# Patient Record
Sex: MALE | Race: BLACK OR AFRICAN AMERICAN | Employment: FULL TIME | URBAN - METROPOLITAN AREA
[De-identification: names, ages, dates, MRNs, and addresses within clinical notes are randomized per-mention and may not be internally consistent; named-entity substitution may affect disease eponyms.]

---

## 2017-02-28 PROBLEM — Z87.39 HISTORY OF GOUT: Status: ACTIVE | Noted: 2017-02-28

## 2017-02-28 PROBLEM — Z86.010 HISTORY OF COLON POLYPS: Status: ACTIVE | Noted: 2017-02-28

## 2017-02-28 PROBLEM — K21.9 GASTROESOPHAGEAL REFLUX DISEASE WITHOUT ESOPHAGITIS: Status: ACTIVE | Noted: 2017-02-28

## 2017-04-04 PROBLEM — G47.26 SHIFT WORK SLEEP DISORDER: Status: ACTIVE | Noted: 2017-04-04

## 2017-07-25 ENCOUNTER — HOSPITAL ENCOUNTER (OUTPATIENT)
Dept: GENERAL RADIOLOGY | Age: 57
Discharge: HOME | End: 2017-07-25
Payer: COMMERCIAL

## 2017-07-25 DIAGNOSIS — R10.11 RUQ ABDOMINAL PAIN: ICD-10-CM

## 2017-07-25 PROCEDURE — 74020 XR ABD (AP AND ERECT OR DECUB): CPT

## 2017-07-26 PROBLEM — K57.30 DIVERTICULOSIS OF LARGE INTESTINE: Status: ACTIVE | Noted: 2017-07-26

## 2017-07-26 PROBLEM — K64.8 INTERNAL HEMORRHOIDS: Status: ACTIVE | Noted: 2017-07-26

## 2017-09-06 ENCOUNTER — HOSPITAL ENCOUNTER (OUTPATIENT)
Dept: ULTRASOUND IMAGING | Age: 57
Discharge: HOME | End: 2017-09-06
Attending: FAMILY MEDICINE
Payer: COMMERCIAL

## 2017-09-06 DIAGNOSIS — R22.0 MASS OF CHIN: ICD-10-CM

## 2017-09-06 PROCEDURE — 76536 US EXAM OF HEAD AND NECK: CPT

## 2017-09-06 NOTE — PROGRESS NOTES
Ultrasound of head and neck: the mass under the chin is a lymph node. Unsure why it swelled so large. Will continue to monitor. There were no signs of infection or malignancy.

## 2018-01-09 PROBLEM — E11.40 TYPE 2 DIABETES MELLITUS WITH DIABETIC NEUROPATHY (HCC): Status: RESOLVED | Noted: 2018-01-09 | Resolved: 2018-01-09

## 2018-01-09 PROBLEM — E11.40 TYPE 2 DIABETES MELLITUS WITH DIABETIC NEUROPATHY (HCC): Status: ACTIVE | Noted: 2018-01-09

## 2018-01-09 PROBLEM — Z87.39 HISTORY OF GOUT: Chronic | Status: ACTIVE | Noted: 2017-02-28

## 2018-01-09 PROBLEM — E11.9 TYPE 2 DIABETES MELLITUS WITHOUT COMPLICATION, WITHOUT LONG-TERM CURRENT USE OF INSULIN (HCC): Chronic | Status: ACTIVE | Noted: 2017-07-25

## 2018-05-08 PROBLEM — E11.9 TYPE 2 DIABETES MELLITUS WITHOUT COMPLICATION, WITHOUT LONG-TERM CURRENT USE OF INSULIN (HCC): Chronic | Status: RESOLVED | Noted: 2017-07-25 | Resolved: 2018-05-08

## 2018-05-08 PROBLEM — E11.21 TYPE 2 DIABETES WITH NEPHROPATHY (HCC): Status: ACTIVE | Noted: 2018-05-08

## 2018-05-09 PROBLEM — D50.9 IRON DEFICIENCY ANEMIA: Status: ACTIVE | Noted: 2018-05-09

## 2019-06-25 PROBLEM — R10.32 LLQ ABDOMINAL PAIN: Status: ACTIVE | Noted: 2019-06-25

## 2019-06-25 PROBLEM — K31.84 GASTROPARESIS DUE TO DM (HCC): Status: ACTIVE | Noted: 2019-06-25

## 2019-06-25 PROBLEM — E11.43 GASTROPARESIS DUE TO DM (HCC): Status: ACTIVE | Noted: 2019-06-25

## 2019-06-25 PROBLEM — R14.0 BLOATING: Status: ACTIVE | Noted: 2019-06-25

## 2019-08-11 ENCOUNTER — HOSPITAL ENCOUNTER (EMERGENCY)
Age: 59
Discharge: HOME | End: 2019-08-11
Payer: COMMERCIAL

## 2019-08-11 ENCOUNTER — APPOINTMENT (OUTPATIENT)
Dept: CT IMAGING | Age: 59
End: 2019-08-11
Payer: COMMERCIAL

## 2019-08-11 ENCOUNTER — APPOINTMENT (OUTPATIENT)
Dept: GENERAL RADIOLOGY | Age: 59
End: 2019-08-11
Payer: COMMERCIAL

## 2019-08-11 VITALS
DIASTOLIC BLOOD PRESSURE: 88 MMHG | WEIGHT: 189 LBS | RESPIRATION RATE: 16 BRPM | SYSTOLIC BLOOD PRESSURE: 139 MMHG | BODY MASS INDEX: 23.5 KG/M2 | OXYGEN SATURATION: 100 % | HEART RATE: 67 BPM | TEMPERATURE: 97.6 F | HEIGHT: 75 IN

## 2019-08-11 DIAGNOSIS — S09.90XA MILD CLOSED HEAD INJURY, INITIAL ENCOUNTER: Primary | ICD-10-CM

## 2019-08-11 DIAGNOSIS — S16.1XXA STRAIN OF NECK MUSCLE, INITIAL ENCOUNTER: ICD-10-CM

## 2019-08-11 DIAGNOSIS — S40.011A CONTUSION OF RIGHT SHOULDER, INITIAL ENCOUNTER: ICD-10-CM

## 2019-08-11 PROCEDURE — 73030 X-RAY EXAM OF SHOULDER: CPT

## 2019-08-11 PROCEDURE — 72125 CT NECK SPINE W/O DYE: CPT

## 2019-08-11 PROCEDURE — 70450 CT HEAD/BRAIN W/O DYE: CPT

## 2019-08-11 PROCEDURE — 74011250636 HC RX REV CODE- 250/636

## 2019-08-11 PROCEDURE — 96372 THER/PROPH/DIAG INJ SC/IM: CPT

## 2019-08-11 PROCEDURE — 99283 EMERGENCY DEPT VISIT LOW MDM: CPT

## 2019-08-11 RX ORDER — KETOROLAC TROMETHAMINE 30 MG/ML
60 INJECTION, SOLUTION INTRAMUSCULAR; INTRAVENOUS
Status: COMPLETED | OUTPATIENT
Start: 2019-08-11 | End: 2019-08-11

## 2019-08-11 RX ORDER — NAPROXEN 500 MG/1
500 TABLET ORAL 2 TIMES DAILY WITH MEALS
Qty: 20 TAB | Refills: 0 | Status: SHIPPED | OUTPATIENT
Start: 2019-08-11 | End: 2019-08-21

## 2019-08-11 RX ORDER — CYCLOBENZAPRINE HCL 10 MG
10 TABLET ORAL
Qty: 12 TAB | Refills: 0 | Status: SHIPPED | OUTPATIENT
Start: 2019-08-11 | End: 2019-08-20 | Stop reason: ALTCHOICE

## 2019-08-11 RX ADMIN — KETOROLAC TROMETHAMINE 60 MG: 30 INJECTION, SOLUTION INTRAMUSCULAR at 05:10

## 2019-08-11 NOTE — LETTER
[address] [address] EMERGENCY DEPT 
[address] [facility] [id number] [phone] Work/School Note Date: 8/11/2019 To Whom It May concern: 
 
Tye Mccartney was seen and treated today in the emergency room by the following provider(s): 
Attending Provider: Joaquina Waldrop MD.   
 
Tye Mccartney may return to work on August 14, 2019. Please excuse from work until then due to the injury. Sincerely, Raven Kay MD

## 2019-08-11 NOTE — ED PROVIDER NOTES
69-year-old man was injured at work when a heavy door came down in his head neck and right shoulder. There is no loss of consciousness but is having a headache neck pain and shoulder pain. Patient was able to finish his shift before coming to the ED    The history is provided by the patient. Neck Pain    This is a new problem. The current episode started 3 to 5 hours ago. The problem occurs constantly. The problem has not changed since onset. There has been no fever. The pain is present in the occipital region. The pain radiates to the right shoulder. The pain is at a severity of 8/10. The pain is moderate. The symptoms are aggravated by certain positions. Pertinent negatives include no photophobia, no visual change and no chest pain. He has tried nothing for the symptoms. Past Medical History:   Diagnosis Date    Arthritis     Diabetes ([address]     does not check BS at home; last a1c 9. something    ED (erectile dysfunction) 9/14/2015    GERD (gastroesophageal reflux disease)     Gout 07/17/2015    left foot    History of colon polyps 2/28/2017    Seen on colonoscopy in Nov. 2016.     Hypertension     Left foot pain 7/17/2015    Mixed hyperlipidemia 7/20/2015    PUD (peptic ulcer disease)     as a teen    Unspecified vitamin D deficiency 7/17/2015       Past Surgical History:   Procedure Laterality Date    COLONOSCOPY N/A 9/26/2016    COLONOSCOPY / BMI 27 performed by Fede [facility] at [facility] ENDOSCOPY    HX ORTHOPAEDIC      right rot cuff    [place] COLSC FLX W/RMVL OF TUMOR POLYP LESION SNARE TQ  9/26/2016              Family History:   Problem Relation Age of Onset    Cancer Mother         Breast cancer     Diabetes Father     Cancer Maternal Grandmother     Diabetes Maternal Grandfather     No Known Problems Paternal Grandmother     No Known Problems Paternal Grandfather     Cancer Brother 44        Pancreatic Cancer    Colon Cancer Neg Hx        Social History     Socioeconomic History    Marital status:      Spouse name: Not on file    Number of children: Not on file    Years of education: Not on file    Highest education level: Not on file   Occupational History    Not on file   Social Needs    Financial resource strain: Not on file    Food insecurity:     Worry: Not on file     Inability: Not on file    Transportation needs:     Medical: Not on file     Non-medical: Not on file   Tobacco Use    Smoking status: Former Smoker     Years: 3.00    Smokeless tobacco: Former User     Quit date: 12/23/1987   Substance and Sexual Activity    Alcohol use: Yes     Alcohol/week: 2.0 standard drinks     Types: 1 Glasses of wine, 1 Cans of beer per week    Drug use: No    Sexual activity: Not Currently   Lifestyle    Physical activity:     Days per week: Not on file     Minutes per session: Not on file    Stress: Not on file   Relationships    Social connections:     Talks on phone: Not on file     Gets together: Not on file     Attends Muslim service: Not on file     Active member of club or organization: Not on file     Attends meetings of clubs or organizations: Not on file     Relationship status: Not on file    Intimate partner violence:     Fear of current or ex partner: Not on file     Emotionally abused: Not on file     Physically abused: Not on file     Forced sexual activity: Not on file   Other Topics Concern    Not on file   Social History Narrative    Not on file         ALLERGIES: Patient has no known allergies. Review of Systems   Constitutional: Negative. Negative for activity change. HENT: Negative. Eyes: Negative. Negative for photophobia. Respiratory: Negative. Cardiovascular: Negative. Negative for chest pain. Gastrointestinal: Negative. Genitourinary: Negative. Musculoskeletal: Positive for neck pain. Skin: Negative. Neurological: Negative. Psychiatric/Behavioral: Negative.     All other systems reviewed and are negative. Vitals:    08/11/19 0438 08/11/19 0440 08/11/19 0535   BP:  150/88 139/88   Pulse:  80 67   Resp:  18 16   Temp:  97.3 °F (36.3 °C) 97.6 °F (36.4 °C)   SpO2:  100% 100%   Weight: 85.7 kg (189 lb)     Height: 6' 3\" (1.905 m)              Physical Exam   Constitutional: He is oriented to person, place, and time. He appears well-developed and well-nourished. No distress. HENT:   Head: Normocephalic and atraumatic. Right Ear: External ear normal.   Left Ear: External ear normal.   Nose: Nose normal.   Mouth/Throat: Oropharynx is clear and moist. No oropharyngeal exudate. Eyes: Pupils are equal, round, and reactive to light. Conjunctivae and EOM are normal. Right eye exhibits no discharge. Left eye exhibits no discharge. No scleral icterus. Neck: Normal range of motion. Neck supple. No JVD present. No tracheal deviation present. Cardiovascular: Normal rate, regular rhythm and intact distal pulses. Pulmonary/Chest: Effort normal and breath sounds normal. No stridor. No respiratory distress. He has no wheezes. He exhibits no tenderness. Abdominal: Soft. Bowel sounds are normal. He exhibits no distension and no mass. There is no tenderness. Musculoskeletal: Normal range of motion. He exhibits no edema. Right shoulder: He exhibits tenderness and pain. Cervical back: He exhibits pain. Neurological: He is alert and oriented to person, place, and time. No cranial nerve deficit. Skin: Skin is warm and dry. No rash noted. He is not diaphoretic. No erythema. No pallor. Psychiatric: He has a normal mood and affect. His behavior is normal. Thought content normal.   Nursing note and vitals reviewed.        MDM  Number of Diagnoses or Management Options  Contusion of right shoulder, initial encounter: minor  Mild closed head injury, initial encounter: minor  Strain of neck muscle, initial encounter: minor  Diagnosis management comments: Contusion to head shoulder and neck no fracture seen on CT or x-rays. Bruising and muscle strain will be treated.        Amount and/or Complexity of Data Reviewed  Tests in the radiology section of CPT®: ordered and reviewed           Procedures

## 2019-08-11 NOTE — ED NOTES
I have reviewed discharge instructions with the patient. The patient verbalized understanding. Patient left ED via Discharge Method: ambulatory to Home with self. Opportunity for questions and clarification provided. Patient given 2 scripts. To continue your aftercare when you leave the hospital, you may receive an automated call from our care team to check in on how you are doing. This is a free service and part of our promise to provide the best care and service to meet your aftercare needs.  If you have questions, or wish to unsubscribe from this service please call [phone]. Thank you for Choosing our [facility] Emergency Department.

## 2019-08-11 NOTE — DISCHARGE INSTRUCTIONS
Patient Education        Neck Strain: Care Instructions  Your Care Instructions    You have strained the muscles and ligaments in your neck. A sudden, awkward movement can strain the neck. This often occurs with falls or car accidents or during certain sports. Everyday activities like working on a computer or sleeping can also cause neck strain if they force you to hold your neck in an awkward position for a long time. It is common for neck pain to get worse for a day or two after an injury, but it should start to feel better after that. You may have more pain and stiffness for several days before it gets better. This is expected. It may take a few weeks or longer for it to heal completely. Good home treatment can help you get better faster and avoid future neck problems. Follow-up care is a key part of your treatment and safety. Be sure to make and go to all appointments, and call your doctor if you are having problems. It's also a good idea to know your test results and keep a list of the medicines you take. How can you care for yourself at home? · If you were given a neck brace (cervical collar) to limit neck motion, wear it as instructed for as many days as your doctor tells you to. Do not wear it longer than you were told to. Wearing a brace for too long can make neck stiffness worse and weaken the neck muscles. · You can try using heat or ice to see if it helps. ? Try using a heating pad on a low or medium setting for 15 to 20 minutes every 2 to 3 hours. Try a warm shower in place of one session with the heating pad. You can also buy single-use heat wraps that last up to 8 hours. ? You can also try an ice pack for 10 to 15 minutes every 2 to 3 hours. · Take pain medicines exactly as directed. ? If the doctor gave you a prescription medicine for pain, take it as prescribed. ? If you are not taking a prescription pain medicine, ask your doctor if you can take an over-the-counter medicine.   · Gently rub the area to relieve pain and help with blood flow. Do not massage the area if it hurts to do so. · Do not do anything that makes the pain worse. Take it easy for a couple of days. You can do your usual activities if they do not hurt your neck or put it at risk for more stress or injury. · Try sleeping on a special neck pillow. Place it under your neck, not under your head. Placing a tightly rolled-up towel under your neck while you sleep will also work. If you use a neck pillow or rolled towel, do not use your regular pillow at the same time. · To prevent future neck pain, do exercises to stretch and strengthen your neck and back. Learn how to use good posture, safe lifting techniques, and proper body mechanics. When should you call for help? Call 911 anytime you think you may need emergency care. For example, call if:    · You are unable to move an arm or a leg at all.   [facility] your doctor now or seek immediate medical care if:    · You have new or worse symptoms in your arms, legs, chest, belly, or buttocks. Symptoms may include:  ? Numbness or tingling. ? Weakness. ? Pain.     · You lose bladder or bowel control.    Watch closely for changes in your health, and be sure to contact your doctor if:    · You are not getting better as expected. Where can you learn more? Go to http://wea-maribell.info/. Enter M253 in the search box to learn more about \"Neck Strain: Care Instructions. \"  Current as of: September 20, 2018  Content Version: 12.1  © 4407-3759 [employer]. Care instructions adapted under license by [employer] (which disclaims liability or warranty for this information). If you have questions about a medical condition or this instruction, always ask your healthcare professional. [place] [address] any warranty or liability for your use of this information.          Patient Education        Learning About a Closed Head Injury  What is a closed head injury? A closed head injury happens when your head gets hit hard. The strong force of the blow causes your brain to shake in your skull. This movement can cause the brain to bruise, swell, or tear. Sometimes nerves or blood vessels also get damaged. This can cause bleeding in or around the brain. A concussion is a type of closed head injury. What are the symptoms? If you have a mild concussion, you may have a mild headache or feel \"not quite right. \" These symptoms are common. They usually go away over a few days to 4 weeks. But sometimes after a concussion, you feel like you can't function as well as before the injury. And you have new symptoms. This is called postconcussive syndrome. You may:  · Find it harder to solve problems, think, concentrate, or remember. · Have headaches. · Have changes in your sleep patterns, such as not being able to sleep or sleeping all the time. · Have changes in your personality. · Not be interested in your usual activities. · Feel angry or anxious without a clear reason. · Lose your sense of taste or smell. · Be dizzy, lightheaded, or unsteady. It may be hard to stand or walk. How is a closed head injury treated? Any person who may have a concussion needs to see a doctor. Some people have to stay in the hospital to be watched. Others can go home safely. If you go home, follow your doctor's instructions. He or she will tell you if you need someone to watch you closely for the next 24 hours or longer. Rest is the best treatment. Get plenty of sleep at night. And try to rest during the day. · Avoid activities that are physically or mentally demanding. These include housework, exercise, and schoolwork. And don't play video games, send text messages, or use the computer. You may need to change your school or work schedule to be able to avoid these activities. · Ask your doctor when it's okay to drive, ride a bike, or operate machinery.   · Take an over-the-counter pain medicine, such as acetaminophen (Tylenol), ibuprofen (Advil, Motrin), or naproxen (Aleve). Be safe with medicines. Read and follow all instructions on the label. · Check with your doctor before you use any other medicines for pain. · Do not drink alcohol or use illegal drugs. They can slow recovery. They can also increase your risk of getting a second head injury. Follow-up care is a key part of your treatment and safety. Be sure to make and go to all appointments, and call your doctor if you are having problems. It's also a good idea to know your test results and keep a list of the medicines you take. Where can you learn more? Go to http://ewa-maribell.info/. Enter E235 in the search box to learn more about \"Learning About a Closed Head Injury. \"  Current as of: March 28, 2019  Content Version: 12.1  © 5352-1778 Healthwise, [employer]. Care instructions adapted under license by [employer] (which disclaims liability or warranty for this information). If you have questions about a medical condition or this instruction, always ask your healthcare professional. [place] [address] any warranty or liability for your use of this information.

## 2019-08-29 ENCOUNTER — HOSPITAL ENCOUNTER (OUTPATIENT)
Dept: PHYSICAL THERAPY | Age: 59
Discharge: HOME | End: 2019-08-29
Payer: COMMERCIAL

## 2019-08-29 PROCEDURE — 97162 PT EVAL MOD COMPLEX 30 MIN: CPT

## 2019-08-29 PROCEDURE — 97110 THERAPEUTIC EXERCISES: CPT

## 2019-08-29 NOTE — THERAPY EVALUATION
Dolly Colorado  : 1960  Primary: [employer]  Secondary:  [address]  at [facility]  [address] [facility], [place] [address] [address] [facility]  [phone]   [phone]          OUTPATIENT PHYSICAL THERAPY:Re-evaluation 2019   ICD-10: Treatment Diagnosis:   Low back pain (M54.5)  Precautions/Allergies:   Patient has no known allergies. TREATMENT PLAN:  Effective Dates: 2019 TO 2019 (90 days). Frequency/Duration: 2 times a week for 90 Day(s) MEDICAL/REFERRING DIAGNOSIS:  Neck sprain [S13. 9XXA]  Shoulder sprain [S43.409A]   DATE OF ONSET: 8/10/19  REFERRING PHYSICIAN: Anthony Zaman MD Orders: eval/treat  Return MD Appointment: unknown     INITIAL ASSESSMENT:  Mr. Tank Recinos presents with concurrent weakness in the cervical and shoulder pain with  scapular/thoracic weakness and stiffness in the spinal region with limitations of occupational duties especially bending, lifting,  and home ADL's. Cervical stiffness, shoulder and scapular weakness are present. Contributing factors include continuing to work without self care. Skilled PT is indicated for her functional mobility deficits. PROBLEM LIST (Impacting functional limitations):  1. Decreased Strength  2. Decreased ADL/Functional Activities  3. Decreased Transfer Abilities  4. Decreased Ambulation Ability/Technique  5. Increased Pain  6. Decreased Activity Tolerance  7. Increased Fatigue  8. Increased Shortness of Breath  9. Decreased Flexibility/Joint Mobility INTERVENTIONS PLANNED: (Treatment may consist of any combination of the following)  1. Cryotherapy  2. Electrical Stimulation  3. Home Exercise Program (HEP)  4. Manual Therapy  5. Neuromuscular Re-education/Strengthening  6. Range of Motion (ROM)  7. Therapeutic Activites  8. Ultrasound (US)     GOALS: (Goals have been discussed and agreed upon with patient.)         1.   Pt will be compliant and independent with HEP in order to increase spinal mobility and UE with core strength/endurance to improve quality of life. 2. Pt will reduce score on NDI to 5/50 in order to demonstrate improved functional mobility and quality of life. 3. Pt will report working for 8 hr shift with minimal interference. 4. Patient to demonstrate increased ROM of 66% in all cervical motions. OUTCOME MEASURE:   Tool Used: Disabilities of the Arm, Shoulder and Hand (DASH) Questionnaire - Quick Version  Score:  Initial: 19/55  Most Recent: X/55 (Date: -- )   Interpretation of Score: The DASH is designed to measure the activities of daily living in person's with upper extremity dysfunction or pain. Each section is scored on a 1-5 scale, 5 representing the greatest disability. The scores of each section are added together for a total score of 55. Tool Used: Neck Disability Index (NDI)  Score:  Initial: 17/50  Most Recent: X/50 (Date: -- )   Interpretation of Score: The Neck Disability Index is a revised form of the Oswestry Low Back Pain Index and is designed to measure the activities of daily living in person's with neck pain. Each section is scored on a 0-5 scale, 5 representing the greatest disability. The scores of each section are added together for a total score of 50. MEDICAL NECESSITY:   · Skilled intervention continues to be required due to decreassed function. REASON FOR SERVICES/OTHER COMMENTS:  · Patient continues to require skilled intervention due to medical complications and patient unable to attend/participate in therapy as expected. Total Duration:  PT Patient Time In/Time Out  Time In: 0930  Time Out: 1015    Rehabilitation Potential For Stated Goals: Flower Armendariz's therapy, I certify that the treatment plan above will be carried out by a therapist or under their direction.   Thank you for this referral,  Keshawn Strauss DPT     Referring Physician Signature: Daniel Gore _______________________________ Date _____________     PAIN/SUBJECTIVE:   Initial:   7/10 Post Session:  6/10   HISTORY:   History of Injury/Illness (Reason for Referral):  Pt reports he was at work when having door fall onto him striking him in the R neck/shld/head region on 8-10-19. Since then he has had pian in neck/shld along with HAs in forehead and on top. Went to MD after incident and treated with meds. Pt reports he is continuing to work and is very uncomfortable. Past Medical History/Comorbidities:   Mr. Myrna Lala  has a past medical history of Arthritis, Diabetes ([address] ED (erectile dysfunction) (9/14/2015), GERD (gastroesophageal reflux disease), Gout (07/17/2015), History of colon polyps (2/28/2017), Hypertension, Left foot pain (7/17/2015), Mixed hyperlipidemia (7/20/2015), PUD (peptic ulcer disease), and Unspecified vitamin D deficiency (7/17/2015). Mr. Myrna Lala  has a past surgical history that includes hx orthopaedic; pr colsc flx w/rmvl of tumor polyp lesion snare tq (9/26/2016); and colonoscopy (N/A, 9/26/2016). Social History/Living Environment:     pt lives with spouse in house,   Prior Level of Function/Work/Activity:  Pt works as  including lifting over 20lbs, squatting, bending, assisting others with same motions. Dominant Side:         RIGHT   Ambulatory/Rehab Services H2 Model Falls Risk Assessment   Risk Factors:       (1)  Gender [Male]       (2)  Any administered antiepileptics/anticonvulsants       (1)  Any administered benzodiazepines Ability to Rise from Chair:       (1)  Pushes up, successful in one attempt   Falls Prevention Plan:       No modifications necessary   Total: (5 or greater = High Risk): 5   ©2010 [facility]. All Rights Reserved. [facility] States Patent #[id number].  Federal Law prohibits the replication, distribution or use without written permission from [employer]   Current Medications:       Current Outpatient Medications:    tiZANidine (ZANAFLEX) 4 mg tablet, Take 1 Tab by mouth every six (6) hours as needed (muscle spasms). Indications: muscle spasm, Disp: 60 Tab, Rfl: 5    ibuprofen (MOTRIN) 600 mg tablet, Take 1 Tab by mouth every six (6) hours as needed for Pain. Indications: pain, Disp: 90 Tab, Rfl: 5    empagliflozin-linagliptin (GLYXAMBI) 25-5 mg tab, Take 1 Tab by mouth daily. Indications: type 2 diabetes mellitus, Disp: 630 Tab, Rfl: 3    chlorthalidone (HYGROTEN) 50 mg tablet, TAKE 1 TABLET BY MOUTH DAILY. INDICATIONS: HYPERTENSION, Disp: 90 Tab, Rfl: 1    prucalopride (MOTEGRITY) 2 mg tab, Take 1 Tab by mouth daily. For constipation from gastroparesis from diabetes, Disp: 30 Each, Rfl: 5    simethicone 250 mg cap, Take 1 Cap by mouth two (2) times a day. Bloating and abdominal pain, Disp: 60 Cap, Rfl: 5    ferrous sulfate 325 mg (65 mg iron) tablet, Take 1 Tab by mouth two (2) times daily (with meals) for 90 days. Indications: anemia from inadequate iron, Disp: 180 Tab, Rfl: 1    OTHER, PAPAVERINE 30 mg + PHENTOLAMINE 3 mg + PROSTAGLANDIN 60 mcg/ml inject 15 units intracavernosal 10 min prior to sex, Disp: 2.6 mL, Rfl: 11    glyBURIDE (DIABETA) 5 mg tablet, TAKE 1 TABLET BY MOUTH TWO (2) TIMES DAILY (WITH MEALS). INDICATIONS: TYPE 2 DIABETES MELLITUS, Disp: , Rfl: 5    allopurinol (ZYLOPRIM) 300 mg tablet, TAKE 1 TABLET BY MOUTH DAILY. INDICATIONS: GOUT, Disp: 90 Tab, Rfl: 1    ergocalciferol (ERGOCALCIFEROL) 50,000 unit capsule, TAKE 1 CAP BY MOUTH EVERY SEVEN (7) DAYS. INDICATIONS: VITAMIN D DEFICIENCY, Disp: 4 Cap, Rfl: 5    atorvastatin (LIPITOR) 40 mg tablet, Take 1 Tab by mouth daily. , Disp: 90 Tab, Rfl: 1    levocetirizine (XYZAL) 5 mg tablet, Take 1 Tab by mouth daily. , Disp: 30 Tab, Rfl: 5    metFORMIN (GLUCOPHAGE) 1,000 mg tablet, TAKE 1 TABLET BY MOUTH TWICE A DAY WITH MEALS FOR 90 DAYS, Disp: 180 Tab, Rfl: 3    amLODIPine (NORVASC) 10 mg tablet, Take 1 Tab by mouth daily. , Disp: 90 Tab, Rfl: 3   linaclotide (LINZESS) 145 mcg cap capsule, Take 1 Cap by mouth Daily (before breakfast). Indications: chronic idiopathic constipation, Disp: 30 Cap, Rfl: 5    pregabalin (LYRICA) 75 mg capsule, Take 1 Cap by mouth three (3) times daily. Max Daily Amount: 225 mg., Disp: 90 Cap, Rfl: 5   Date Last Reviewed:  8/29/2019     Number of Personal Factors/Comorbidities that affect the Plan of Care: 1-2: MODERATE COMPLEXITY   EXAMINATION:   Palpation:          Very tight bilat paracervical, traps, SCMs,. PROM of C2-7 is 1/6 with guarded motion. ROM:            Range of Motion Tight end ranges Tight end ranges           Joint: Date: 8/29/19  Date:   Date:       Right Left Right Left Right Left   cerv              , rot  10%  33            SB 10%  33           B bend  10             Fwd bend  50                             flex 120 120       abduction 125 125        ER  60 80           IR 45 80       Strength:          STRENGTH               Joint: Date:8/29/2019   Date:   Date:       Right Left Right Left Right Left   flex 3-/5 3-/5           abduction 3-/5 3-/5           ER 2/5 3/5           IR 2/5 3/5                                Body Structures Involved:  1. Nerves  2. Eyes and Ears  3. Bones  4. Joints  5. Muscles  6. Ligaments Body Functions Affected:  1. Mental  2. Sensory/Pain  3. Neuromusculoskeletal  4. Movement Related Activities and Participation Affected:  1. General Tasks and Demands  2. Mobility  3. Self Care  4. Domestic Life  5.  Community, Social and [place]   Number of elements (examined above) that affect the Plan of Care: 3: MODERATE COMPLEXITY   CLINICAL PRESENTATION:   Presentation: Evolving clinical presentation with changing clinical characteristics: MODERATE COMPLEXITY   CLINICAL DECISION MAKING:   Use of outcome tool(s) and clinical judgement create a POC that gives a: Questionable prediction of patient's progress: MODERATE COMPLEXITY

## 2019-08-29 NOTE — PROGRESS NOTES
Pippa Perez  : 1960  Primary: [employer]  Secondary:  [address]  at [facility]  [address], [address], [place], [facility] [address] [facility]  [phone]   [phone]        OUTPATIENT PHYSICAL THERAPY: Daily Treatment Note 2019  Visit Count:  1        Pre-treatment Symptoms/Complaints:  See eval  Pain: Initial:   7/10 Post Session:  6/10   Medications Last Reviewed:  2019  Updated Objective Findings:  See evaluation note from today  TREATMENT:     THERAPEUTIC EXERCISE: (10 minutes):  Exercises per grid below to improve mobility, strength, balance and coordination. Required maximal verbal cues to promote proper body alignment, promote proper body posture and promote proper body mechanics. Progressed range and complexity of movement as indicated. Date:  19 Date:   Date:     Activity/Exercise Parameters Parameters Parameters   Neck stretch on wall x3 [employer] Portal  Treatment/Session Summary:    · Response to Treatment:  pt originally having difficulty with guarding, but abble to adjust. reports maximum disconfort with minimal stretching, but felt a little better after session. Pt has poor fwd head. .  · Communication/Consultation:  None today  · Equipment provided today:  None today  · Recommendations/Intent for next treatment session: Next visit will focus on stretching, jnt mobs, strengthening.     Total Treatment Billable Duration:  10   minutes  PT Patient Time In/Time Out  Time In: 930  Time Out: 14[address] [facility], DPT    Future Appointments   Date Time Provider Ken Yu   2019  8:45 AM Khurram Bass [facility] SFHUGO   9/10/2019  9:30 AM VIKI Gordon   2019  9:30 AM VIKI Gordon HUGO   2019  9:00 AM SFHUGO PHONE APPOINTMENT SHELBY ROBERTS OR PRE A   2019  9:30 AM Khurram Bass [facility] SFHUGO   2019  9:30 AM Billy Rodrigues DPT [facility] SFD   9/26/2019  9:30 AM Anjali Alfaro DPT [facility]   9/30/2019  8:20 AM Mario Mejia MD [facility] JOSSELYN POW

## 2019-09-04 ENCOUNTER — HOSPITAL ENCOUNTER (OUTPATIENT)
Dept: PHYSICAL THERAPY | Age: 59
Discharge: HOME | End: 2019-09-04
Payer: COMMERCIAL

## 2019-09-04 PROCEDURE — 97140 MANUAL THERAPY 1/> REGIONS: CPT

## 2019-09-04 PROCEDURE — 97110 THERAPEUTIC EXERCISES: CPT

## 2019-09-04 NOTE — PROGRESS NOTES
Lino Retana  : 1960  Primary: [employer]  Secondary:  [address]  at [facility], [address], [place], [address] [facility]  [phone]   [phone]        OUTPATIENT PHYSICAL THERAPY: Daily Treatment Note 2019  Visit Count:  2        Pre-treatment Symptoms/Complaints: Patient reports pain level is 7/10 today in right neck and shoulder/upper back area. 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 Patient states he gets headaches/migraines at time with this injury. Pain: Initial:   7/10 Post Session: 6 /10   Medications Last Reviewed:  2019  Updated Objective Findings:  None Today  TREATMENT:     MODALITIES: (20 minute total) Prior to manual therapy moist heat to neck and upper traps for 10 minutes and after manual therapy ice pack to same for 10 minutes to help decrease pain. MANUAL THERAPY: (30 minutes) Patient supine with knee wedge in place for STM to bilateral neck, upper traps, SCM, scalenes, and suboccipital areas followed by suboccipital release and gentle distraction. Gentle passive cervical rotation B and upper trap stretch B.    THERAPEUTIC EXERCISE: (10 minutes):  Exercises per grid below to improve mobility, strength, balance and coordination. Required maximal verbal cues to promote proper body alignment, promote proper body posture and promote proper body mechanics. Progressed range and complexity of movement as indicated. Date:  19 Date:  19 Date:     Activity/Exercise Parameters Parameters Parameters   Neck stretch on wall   x3 modA     Active cervical rotation    5 x 5-10 second hold B    Active cervical side bend    5 x 5-10 second hold B                                Med[employer] Portal  Treatment/Session Summary:    · Response to Treatment:  Patient tolerated manual therapy with some discomfort due to tenderness and tightness but reported that it helped decrease pain and improve mobility.  Patient stated that the ice felt good after treatment. Patient with improved cervical mobility and understood new exercises well with good performance. · Communication/Consultation:  None today  · Equipment provided today:  None today  · Recommendations/Intent for next treatment session: Next visit will focus on stretching, jnt mobs, strengthening.     Total Treatment Billable Duration:  35   minutes  PT Patient Time In/Time Out  Time In: 0845  Time Out: [address] [facility], PTA    Future Appointments   Date Time Provider Ken Yu   9/10/2019  9:30 AM Desiree Will DPT [facility] ALEJANDRA   9/12/2019  9:30 AM VIKI Ordonez   9/16/2019  9:00 AM ALEJANDRA PHONE APPOINTMENT SHELBY ROBERTS OR PRE A   9/17/2019  9:30 AM VISHAL Terry   9/24/2019  9:30 AM VIKI Ordonez   9/26/2019  9:30 AM Desiree Will DPT [facility]   9/30/2019  8:20 AM Oleg Watson MD [facility]

## 2019-09-10 ENCOUNTER — HOSPITAL ENCOUNTER (OUTPATIENT)
Dept: PHYSICAL THERAPY | Age: 59
Discharge: HOME | End: 2019-09-10
Payer: COMMERCIAL

## 2019-09-10 PROCEDURE — 97110 THERAPEUTIC EXERCISES: CPT

## 2019-09-10 PROCEDURE — 97014 ELECTRIC STIMULATION THERAPY: CPT

## 2019-09-10 PROCEDURE — 97140 MANUAL THERAPY 1/> REGIONS: CPT

## 2019-09-10 NOTE — PROGRESS NOTES
Madhu Nolan  : 1960  Primary: [employer]  Secondary:  [address]  at [facility]  [address], [address], [place], [facility]  [phone]   [phone]        OUTPATIENT PHYSICAL THERAPY: Daily Treatment Note 9/10/2019  Visit Count:  3        Pre-treatment Symptoms/Complaints: Patient reports feeling better with decreased HAs. Pain: Initial:   5/10 Post Session: 4 /10   Medications Last Reviewed:  9/10/2019  Updated Objective Findings:  None Today  TREATMENT:     MODALITIES: (10 minute total) Prior to manual therapy moist heat to neck and upper traps for 10 minutes and after manual therapy ice pack to same for 10 minutes to help decrease pain. MANUAL THERAPY: (14 minutes) Patient supine with knee wedge in place for STM to bilateral neck, upper traps, SCM, scalenes, and suboccipital areas followed by suboccipital release and gentle distraction. Gentle passive cervical rotation B and upper trap stretch B.    THERAPEUTIC EXERCISE: (25 minutes):  Exercises per grid below to improve mobility, strength, balance and coordination. Required maximal verbal cues to promote proper body alignment, promote proper body posture and promote proper body mechanics. Progressed range and complexity of movement as indicated. Date:  19 Date:  19 Date:  9/10/19   Activity/Exercise Parameters Parameters Parameters   Neck stretch on wall   x3 modA     Active cervical rotation    5 x 5-10 second hold B    Active cervical side bend    5 x 5-10 second hold B    Nu step   10min   Foam roll   Static, head rot, swim,    punches   On foam roll             MedBridge Portal  Treatment/Session Summary:    · Response to Treatment:  Tight and guarded with L levator, trap. Unable to perform cerv mobility due to guarding. Slow with exer. Pt very relaxed after session.   · Communication/Consultation:  None today  · Equipment provided today:  None today  · Recommendations/Intent for next treatment session: Next visit will focus on stretching, jnt mobs, strengthening.     Total Treatment Billable Duration:  49   minutes  PT Patient Time In/Time Out  Time In: 0930  Time Out: [address] [facility]    Future Appointments   Date Time Provider Ken Yu   9/12/2019  9:30 AM VIKI Banda   9/16/2019  9:00 AM ALEJANDRA PHONE APPOINTMENT SHELBY ROBERTS OR PRE A   9/17/2019  9:30 AM VISHAL Rowan   9/24/2019  9:30 AM VIKI Banda   9/26/2019  9:30 AM VIKI Banda   9/30/2019  8:20 AM Nasreen Zhang MD [facility]

## 2019-09-12 ENCOUNTER — HOSPITAL ENCOUNTER (OUTPATIENT)
Dept: PHYSICAL THERAPY | Age: 59
Discharge: HOME | End: 2019-09-12
Payer: COMMERCIAL

## 2019-09-12 PROCEDURE — 97014 ELECTRIC STIMULATION THERAPY: CPT

## 2019-09-12 PROCEDURE — 97140 MANUAL THERAPY 1/> REGIONS: CPT

## 2019-09-12 PROCEDURE — 97110 THERAPEUTIC EXERCISES: CPT

## 2019-09-12 NOTE — PROGRESS NOTES
Judith Martin  : 1960  Primary: [employer]  Secondary:  [address] at [facility]  [address], [address], [place], [facility]  [phone]   [phone]        OUTPATIENT PHYSICAL THERAPY: Daily Treatment Note 2019  Visit Count:  4        Pre-treatment Symptoms/Complaints: Patient reports feeling better with decreased HAs. Pain: Initial:   7/10 Post Session: 3 /10   Medications Last Reviewed:  2019  Updated Objective Findings:  None Today  TREATMENT:     MODALITIES: (10 minute total) Prior to manual therapy moist heat to neck and upper traps for 10 minutes and after manual therapy ice pack to same for 10 minutes to help decrease pain. MANUAL THERAPY: (24 minutes) Patient supine with knee wedge in place for STM to bilateral neck, upper traps, SCM, scalenes, and suboccipital areas followed by suboccipital release and gentle distraction. Gentle passive cervical rotation B and upper trap stretch B.    THERAPEUTIC EXERCISE: (10 minutes):  Exercises per grid below to improve mobility, strength, balance and coordination. Required maximal verbal cues to promote proper body alignment, promote proper body posture and promote proper body mechanics. Progressed range and complexity of movement as indicated. Date:  19 Date:  19 Date:  9/10/19   Date  19   Activity/Exercise Parameters Parameters Parameters    Neck stretch on wall   x3 modA      Active cervical rotation    5 x 5-10 second hold B     Active cervical side bend    5 x 5-10 second hold B     Nu step   10min 10min   Foam roll   Static, head rot, swim,  Static, head rot str, punches   punches   On foam roll On foam roll, 1#, 2x10              MedBridge Portal  Treatment/Session Summary:    · Response to Treatment:  Pt understanding stretches decrease HAs,  Tight paracerv, L levator and bilat SCMs. Minimal jnt mob for R SB of C2, pt had full jnt motion in C2-5 after mobs. · Communication/Consultation:  None today  · Equipment provided today:  None today  · Recommendations/Intent for next treatment session: Next visit will focus on stretching, jnt mobs, strengthening.     Total Treatment Billable Duration:  49   minutes  PT Patient Time In/Time Out  Time In: 0930  Time Out: 14[address] [facility], T    Future Appointments   Date Time Provider Ken Aimee   9/16/2019  9:00 AM ALEJANDRA PHONE APPOINTMENT SHELBY ROBERTS OR PRE A   9/17/2019  9:30 AM Shagufta Chavez PTA [facility] ALEJANDRA   9/24/2019  9:30 AM VIKI Carpenter   9/26/2019  9:30 AM VIKI Carpenter   9/30/2019  8:20 AM Gerardo WhitmanstAbhishek carreon MD [facility]

## 2019-09-17 ENCOUNTER — HOSPITAL ENCOUNTER (OUTPATIENT)
Dept: PHYSICAL THERAPY | Age: 59
Discharge: HOME | End: 2019-09-17
Payer: COMMERCIAL

## 2019-09-17 PROCEDURE — 97140 MANUAL THERAPY 1/> REGIONS: CPT

## 2019-09-17 PROCEDURE — 97014 ELECTRIC STIMULATION THERAPY: CPT

## 2019-09-17 NOTE — PROGRESS NOTES
Aruna Whelan  : 1960  Primary: [employer]  Secondary:  [address] at [facility]  Krista Taveras [facility] [address], [address], [place], [address] [facility]  [phone]   [phone]        OUTPATIENT PHYSICAL THERAPY: Daily Treatment Note 2019  Visit Count:  5        Pre-treatment Symptoms/Complaints: Patient reports a migraine today. Pain level 5/10  Pain: Initial:   510 Post Session:  /10   Medications Last Reviewed:  2019  Updated Objective Findings:  None Today  TREATMENT:     MODALITIES: (15 minutes) patient supine with knee wedge in place for moist heat and IFES to bilateral upper traps and scapular borders at 14 ma to help decrease tightness and help decrease migraine. Following manual ice pack to neck x 10 minutes. MANUAL THERAPY: (20 minutes) Patient supine with knee wedge in place for STM to bilateral neck, upper traps, SCM, scalenes, and suboccipital areas followed by suboccipital release and gentle distraction. Gentle passive cervical rotation B and upper trap stretch B.    THERAPEUTIC EXERCISE: (0 minutes):  Exercises per grid below to improve mobility, strength, balance and coordination. Required maximal verbal cues to promote proper body alignment, promote proper body posture and promote proper body mechanics. Progressed range and complexity of movement as indicated. Date:  19 Date:  19 Date:  9/10/19   Date  19   Activity/Exercise Parameters Parameters Parameters    Neck stretch on wall   x3 modA      Active cervical rotation    5 x 5-10 second hold B     Active cervical side bend    5 x 5-10 second hold B     Nu step   10min 10min   Foam roll   Static, head rot, swim,  Static, head rot str, punches   punches   On foam roll On foam roll, 1#, 2x10              [employer] Portal  Treatment/Session Summary:    · Response to Treatment:  Focus on pain relief today due to migraine.  Patient reported good relief following treatment with headache resolved. Continue per plan of care and work on progression with exercises next visit. · Communication/Consultation:  None today  · Equipment provided today:  None today  · Recommendations/Intent for next treatment session: Next visit will focus on stretching, jnt mobs, strengthening.     Total Treatment Billable Duration:  35   minutes  PT Patient Time In/Time Out  Time In: 0930  Time Out: [address], PTA    Future Appointments   Date Time Provider Ken Yu   9/24/2019  9:30 AM Zakiya Yoon DPT [facility] SFD   9/26/2019  9:30 AM Zakiya Yoon DPT [facility]   9/30/2019  8:20 AM Norma Sams MD [facility] POW

## 2019-09-23 ENCOUNTER — HOSPITAL ENCOUNTER (OUTPATIENT)
Age: 59
Setting detail: OUTPATIENT SURGERY
Discharge: HOME | End: 2019-09-23
Attending: SURGERY | Admitting: SURGERY
Payer: COMMERCIAL

## 2019-09-23 ENCOUNTER — ANESTHESIA EVENT (OUTPATIENT)
Dept: ENDOSCOPY | Age: 59
End: 2019-09-23
Payer: COMMERCIAL

## 2019-09-23 ENCOUNTER — ANESTHESIA (OUTPATIENT)
Dept: ENDOSCOPY | Age: 59
End: 2019-09-23
Payer: COMMERCIAL

## 2019-09-23 VITALS
TEMPERATURE: 97.7 F | HEART RATE: 80 BPM | SYSTOLIC BLOOD PRESSURE: 127 MMHG | WEIGHT: 198 LBS | RESPIRATION RATE: 20 BRPM | HEIGHT: 75 IN | BODY MASS INDEX: 24.62 KG/M2 | DIASTOLIC BLOOD PRESSURE: 71 MMHG | OXYGEN SATURATION: 98 %

## 2019-09-23 LAB
COLONOSCOPY, EXTERNAL: NORMAL
GLUCOSE BLD STRIP.AUTO-MCNC: 215 MG/DL (ref 65–100)
GLUCOSE BLD STRIP.AUTO-MCNC: 243 MG/DL (ref 65–100)

## 2019-09-23 PROCEDURE — 74011250636 HC RX REV CODE- 250/636

## 2019-09-23 PROCEDURE — 82962 GLUCOSE BLOOD TEST: CPT

## 2019-09-23 PROCEDURE — 74011636637 HC RX REV CODE- 636/637: Performed by: ANESTHESIOLOGY

## 2019-09-23 PROCEDURE — 76060000032 HC ANESTHESIA 0.5 TO 1 HR: Performed by: SURGERY

## 2019-09-23 PROCEDURE — 76040000025: Performed by: SURGERY

## 2019-09-23 PROCEDURE — 74011250636 HC RX REV CODE- 250/636: Performed by: SURGERY

## 2019-09-23 PROCEDURE — 74011000250 HC RX REV CODE- 250

## 2019-09-23 RX ORDER — PROPOFOL 10 MG/ML
INJECTION, EMULSION INTRAVENOUS
Status: DISCONTINUED | OUTPATIENT
Start: 2019-09-23 | End: 2019-09-23 | Stop reason: HOSPADM

## 2019-09-23 RX ORDER — EPHEDRINE SULFATE 50 MG/ML
INJECTION, SOLUTION INTRAVENOUS AS NEEDED
Status: DISCONTINUED | OUTPATIENT
Start: 2019-09-23 | End: 2019-09-23 | Stop reason: HOSPADM

## 2019-09-23 RX ORDER — LIDOCAINE HYDROCHLORIDE 20 MG/ML
INJECTION, SOLUTION EPIDURAL; INFILTRATION; INTRACAUDAL; PERINEURAL AS NEEDED
Status: DISCONTINUED | OUTPATIENT
Start: 2019-09-23 | End: 2019-09-23 | Stop reason: HOSPADM

## 2019-09-23 RX ORDER — INSULIN LISPRO 100 [IU]/ML
3 INJECTION, SOLUTION INTRAVENOUS; SUBCUTANEOUS ONCE
Status: COMPLETED | OUTPATIENT
Start: 2019-09-23 | End: 2019-09-23

## 2019-09-23 RX ORDER — PROPOFOL 10 MG/ML
INJECTION, EMULSION INTRAVENOUS AS NEEDED
Status: DISCONTINUED | OUTPATIENT
Start: 2019-09-23 | End: 2019-09-23 | Stop reason: HOSPADM

## 2019-09-23 RX ORDER — SODIUM CHLORIDE, SODIUM LACTATE, POTASSIUM CHLORIDE, CALCIUM CHLORIDE 600; 310; 30; 20 MG/100ML; MG/100ML; MG/100ML; MG/100ML
1000 INJECTION, SOLUTION INTRAVENOUS CONTINUOUS
Status: DISCONTINUED | OUTPATIENT
Start: 2019-09-23 | End: 2019-09-23 | Stop reason: HOSPADM

## 2019-09-23 RX ADMIN — SODIUM CHLORIDE, SODIUM LACTATE, POTASSIUM CHLORIDE, AND CALCIUM CHLORIDE 1000 ML: 600; 310; 30; 20 INJECTION, SOLUTION INTRAVENOUS at 07:52

## 2019-09-23 RX ADMIN — EPHEDRINE SULFATE 5 MG: 50 INJECTION, SOLUTION INTRAVENOUS at 09:30

## 2019-09-23 RX ADMIN — LIDOCAINE HYDROCHLORIDE 50 MG: 20 INJECTION, SOLUTION EPIDURAL; INFILTRATION; INTRACAUDAL; PERINEURAL at 09:14

## 2019-09-23 RX ADMIN — PROPOFOL 200 MCG/KG/MIN: 10 INJECTION, EMULSION INTRAVENOUS at 09:17

## 2019-09-23 RX ADMIN — EPHEDRINE SULFATE 10 MG: 50 INJECTION, SOLUTION INTRAVENOUS at 09:37

## 2019-09-23 RX ADMIN — PROPOFOL 70 MG: 10 INJECTION, EMULSION INTRAVENOUS at 09:17

## 2019-09-23 RX ADMIN — INSULIN LISPRO 3 UNITS: 100 INJECTION, SOLUTION INTRAVENOUS; SUBCUTANEOUS at 09:00

## 2019-09-23 NOTE — PROCEDURES
Procedure in Detail:  Informed consent was obtained for the procedure. The patient was placed in the left lateral decubitus position and sedation was induced by anesthesia. The scope was inserted into the rectum and advanced under direct vision to the cecum, which was identified by the ileocecal valve and appendiceal orifice. The quality of the colonic preparation was fair; copious thick slurry was able to be lavaged but incompletely. A careful inspection was made as the colonoscope was withdrawn, including a retroflexed view of the rectum; findings and interventions are described below. Appropriate photodocumentation was obtained. Findings:   Rectum:     - Protruding lesions:     -internal hemorrhoids x 4  Sigmoid:     - Excavated lesions:     - Diverticulosis  Descending Colon:     - Excavated lesions:     - Diverticulosis  Transverse Colon:     - Excavated lesions:     - Diverticulum  Ascending Colon:     - Excavated lesions:     - Diverticulum  Cecum:   Normal          Specimens: No specimens were collected. Complications: None; patient tolerated the procedure well. \    EBL - none    Recommendations:   - Repeat colonoscopy in 5 years.      - due to prep and polyp history     Signed By: Paty Parekh MD                        September 23, 2019

## 2019-09-23 NOTE — ROUTINE PROCESS
VSS. Discharge instructions reviewed with patient and Danyell Raygoza, friend and copy of instructions sent home with patient. Dr. Isi Goodrich spoke with patient and friend prior to discharge. Questions answered. Discharged via wheel chair, wheeled out by Raghu. IV discontinued prior to discharge.

## 2019-09-23 NOTE — H&P
History and Physical      Patient: Manolo Dominguez    Physician: Rosco Opitz, MD    Referring Physician: Ella Hughes MD    Chief Complaint: For colonoscopy    History of Present Illness: Pt presents for colonoscopy. Initial study 2016 with 2 adenomas, one of which was 1cm. History:  Past Medical History:   Diagnosis Date    Arthritis     Diabetes ([address]     type 2. does not check BS at home; 8/20/19-Hgb A1C=8. 3. PCP note (8/20/19) states uncontrolled, new Rx    ED (erectile dysfunction) 9/14/2015    GERD (gastroesophageal reflux disease)     diet controlled    Gout 07/17/2015    left foot    History of colon polyps 2/28/2017    Seen on colonoscopy in Nov. 2016.  Hypertension     controlled w/med    Left foot pain 7/17/2015    Mixed hyperlipidemia 7/20/2015    PUD (peptic ulcer disease)     as a teen    Unspecified vitamin D deficiency 07/17/2015    Rx vit D qwk     Past Surgical History:   Procedure Laterality Date    COLONOSCOPY N/A 9/26/2016    COLONOSCOPY / BMI 27 performed by Rosco Opitz, [facility] at [facility] ENDOSCOPY    HX ORTHOPAEDIC      right rot cuff    MS COLSC FLX W/RMVL OF TUMOR POLYP LESION SNARE TQ  9/26/2016           Social History     Socioeconomic History    Marital status:      Spouse name: Not on file    Number of children: Not on file    Years of education: Not on file    Highest education level: Not on file   Tobacco Use    Smoking status: Former Smoker     Years: 3.00    Smokeless tobacco: Former User     Quit date: 12/23/1987   Substance and Sexual Activity    Alcohol use:  Yes     Alcohol/week: 2.0 standard drinks     Types: 1 Glasses of wine, 1 Cans of beer per week     Comment: occ    Drug use: No    Sexual activity: Not Currently      Family History   Problem Relation Age of Onset    Cancer Mother         Breast cancer     Diabetes Father     Cancer Maternal Grandmother     Diabetes Maternal Grandfather     No Known Problems Paternal Grandmother     No Known Problems Paternal Grandfather     Cancer Brother 44        Pancreatic Cancer    Colon Cancer Neg Hx        Medications:   Prior to Admission medications    Medication Sig Start Date End Date Taking? Authorizing Provider   ezetimibe (ZETIA) 10 mg tablet Take 1 Tab by mouth daily. 9/2/19  Yes Nancy Garcia MD   empagliflozin-linagliptin [facility]) 25-5 mg tab Take 1 Tab by mouth daily. Indications: type 2 diabetes mellitus 8/20/19  Yes Nancy Garcia MD   chlorthalidone (HYGROTEN) 50 mg tablet TAKE 1 TABLET BY MOUTH DAILY. INDICATIONS: HYPERTENSION 7/2/19  Yes Nancy Garcia MD   OTHER PAPAVERINE 30 mg + PHENTOLAMINE 3 mg + PROSTAGLANDIN 60 mcg/ml inject 15 units intracavernosal 10 min prior to sex 5/1/19  Yes Nancy Garcia MD   glyBURIDE (DIABETA) 5 mg tablet TAKE 1 TABLET BY MOUTH TWO (2) TIMES DAILY (WITH MEALS). INDICATIONS: TYPE 2 DIABETES MELLITUS 3/9/19  Yes Provider, Historical   allopurinol (ZYLOPRIM) 300 mg tablet TAKE 1 TABLET BY MOUTH DAILY. INDICATIONS: GOUT  Patient taking differently: Per anesthesia protocol:instructed to take am of surgery. 3/28/19  Yes Nancy Garcia MD   atorvastatin (LIPITOR) 40 mg tablet Take 1 Tab by mouth daily. Patient taking differently: Take 40 mg by mouth nightly. Indications: high cholesterol 1/25/19  Yes Nancy Garcia MD   levocetirizine (XYZAL) 5 mg tablet Take 1 Tab by mouth daily. 1/24/19  Yes Nancy Garcia MD   metFORMIN (GLUCOPHAGE) 1,000 mg tablet TAKE 1 TABLET BY MOUTH TWICE A DAY WITH MEALS FOR 90 DAYS 1/21/19  Yes Nancy Garcia MD   amLODIPine (NORVASC) 10 mg tablet Take 1 Tab by mouth daily. Patient taking differently: Take 10 mg by mouth daily. Per anesthesia protocol:instructed to take am of surgery. Indications: high blood pressure 1/21/19  Yes Nancy Garcia MD   linaclotide Magdalene Luster) 145 mcg cap capsule Take 1 Cap by mouth Daily (before breakfast).  Indications: chronic idiopathic constipation 1/9/18  Yes Pippa Anderson MD   tiZANidine (ZANAFLEX) 4 mg tablet Take 1 Tab by mouth every six (6) hours as needed (muscle spasms). Indications: muscle spasm 8/20/19   Pippa Anderson MD   ibuprofen (MOTRIN) 600 mg tablet Take 1 Tab by mouth every six (6) hours as needed for Pain. Indications: pain 8/20/19   Pippa Anderson MD   prucalopride (MOTEGRITY) 2 mg tab Take 1 Tab by mouth daily. For constipation from gastroparesis from diabetes 6/25/19   Pippa Anderson MD   simethicone 250 mg cap Take 1 Cap by mouth two (2) times a day. Bloating and abdominal pain 6/25/19   Pippa Anderson MD   ergocalciferol (ERGOCALCIFEROL) 50,000 unit capsule TAKE 1 CAP BY MOUTH EVERY SEVEN (7) DAYS. INDICATIONS: VITAMIN D DEFICIENCY 1/28/19   Pippa Anderson MD   pregabalin (LYRICA) 75 mg capsule Take 1 Cap by mouth three (3) times daily. Max Daily Amount: 225 mg. 2/28/17   Pippa Anderson MD       Allergies: No Known Allergies    Physical Exam:     Vital Signs:   Visit Vitals  /76   Pulse 87   Temp 98.2 °F (36.8 °C)   Resp 16   Ht 6' 3\" (1.905 m)   Wt 198 lb (89.8 kg)   SpO2 97%   BMI 24.75 kg/m²     . General: no distress      Heart: regular   Lungs: unlabored   Abdominal: soft   Neurological: Grossly normal        Findings/Diagnosis: Encounter for colorectal cancer screening due to personal history of adenomatous polyp(s)     Plan of Care/Planned Procedure: Colonoscopy, possible polypectomy. Pt/designee has reviewed the colonoscopy information sheet. Any questions have been discussed. They agree to proceed.       Signed:  Milan Jim MD   9/23/2019

## 2019-09-23 NOTE — ANESTHESIA POSTPROCEDURE EVALUATION
Procedure(s):  COLONOSCOPY / BMI 26. total IV anesthesia    Anesthesia Post Evaluation      Multimodal analgesia: multimodal analgesia used between 6 hours prior to anesthesia start to PACU discharge  Patient location during evaluation: bedside  Patient participation: complete - patient participated  Level of consciousness: responsive to verbal stimuli  Pain management: adequate  Airway patency: patent  Anesthetic complications: no  Cardiovascular status: hemodynamically stable  Respiratory status: spontaneous ventilation  Hydration status: stable        Vitals Value Taken Time   /71 9/23/2019 10:17 AM   Temp     Pulse 79 9/23/2019 10:19 AM   Resp 20 9/23/2019 10:17 AM   SpO2 98 % 9/23/2019 10:19 AM   Vitals shown include unvalidated device data.

## 2019-09-23 NOTE — ANESTHESIA PREPROCEDURE EVALUATION
Anesthetic History   No history of anesthetic complications            Review of Systems / Medical History  Patient summary reviewed, nursing notes reviewed and pertinent labs reviewed    Pulmonary  Within defined limits                 Neuro/Psych              Cardiovascular    Hypertension: well controlled          Hyperlipidemia         GI/Hepatic/Renal     GERD: well controlled           Endo/Other    Diabetes: type 2         Other Findings              Physical Exam    Airway  Mallampati: I  TM Distance: 4 - 6 cm  Neck ROM: normal range of motion   Mouth opening: Diminished (comment)     Cardiovascular    Rhythm: regular           Dental  No notable dental hx       Pulmonary  Breath sounds clear to auscultation               Abdominal         Other Findings            Anesthetic Plan    ASA: 2  Anesthesia type: total IV anesthesia          Induction: Intravenous  Anesthetic plan and risks discussed with: Patient

## 2019-09-24 ENCOUNTER — HOSPITAL ENCOUNTER (OUTPATIENT)
Dept: PHYSICAL THERAPY | Age: 59
Discharge: HOME | End: 2019-09-24
Payer: COMMERCIAL

## 2019-09-24 PROCEDURE — 97140 MANUAL THERAPY 1/> REGIONS: CPT

## 2019-09-24 PROCEDURE — 97110 THERAPEUTIC EXERCISES: CPT

## 2019-09-24 NOTE — PROGRESS NOTES
William Jeff  : 1960  Primary: [employer]  Secondary:  [address] at [facility]  [facility] [facility] [address], [place], [address] [facility]  [phone]   [phone]        OUTPATIENT PHYSICAL THERAPY: Daily Treatment Note 2019  Visit Count:  Visit count could not be calculated. Make sure you are using a visit which is associated with an episode. Pre-treatment Symptoms/Complaints: Patient reports a slight HA today. He also reports that he has same stress level in neck/arms at work as usual.   Pain: Initial:   310 Post Session: 0 10   Medications Last Reviewed:  2019  Updated Objective Findings:  None Today  TREATMENT:     MODALITIES: ( minutes) patient supine with knee wedge in place for moist heat and IFES to bilateral upper traps and scapular borders at 14 ma to help decrease tightness and help decrease migraine. Following manual ice pack to neck x 10 minutes. MANUAL THERAPY: (10 minutes) Patient supine with knee wedge in place for STM to bilateral neck, upper traps, SCM, scalenes, and suboccipital areas followed by suboccipital release and gentle distraction. Gentle passive cervical rotation B and upper trap stretch B.    THERAPEUTIC EXERCISE: (30 minutes):  Exercises per grid below to improve mobility, strength, balance and coordination. Required maximal verbal cues to promote proper body alignment, promote proper body posture and promote proper body mechanics. Progressed range and complexity of movement as indicated.    Date:  19 Date:  19 Date:  9/10/19   Date  19 Date:  19   Activity/Exercise Parameters Parameters Parameters     Neck stretch on wall   x3 modA       Active cervical rotation    5 x 5-10 second hold B      Active cervical side bend    5 x 5-10 second hold B      Nu step/UBE   10min 10min 5/5min   Foam roll   Static, head rot, swim,  Static, head rot str, punches Static, head rot,    punches   On foam roll On foam roll, 1#, 2x10 On foam roll, 2#, 2x10   ER     On roll, 2x10   date    [employer] Portal  Treatment/Session Summary:    · Response to Treatment:  Emphasis on stretching of SCMs and edu on keeping SCMs relaxed during working positions. · Communication/Consultation:  None today  · Equipment provided today:  None today  · Recommendations/Intent for next treatment session: Next visit will focus on stretching, jnt mobs, strengthening.     Total Treatment Billable Duration:  40   minutes  PT Patient Time In/Time Out  Time In: 0930  Time Out: Merrill Thakur [address], DPT    Future Appointments   Date Time Provider Ken Yu   9/26/2019  9:30 AM KATERINA SilvaT [facility] Akil James   9/30/2019  8:20 AM Eva Stephens MD [facility]

## 2019-09-26 ENCOUNTER — HOSPITAL ENCOUNTER (OUTPATIENT)
Dept: PHYSICAL THERAPY | Age: 59
Discharge: HOME | End: 2019-09-26
Payer: COMMERCIAL

## 2019-09-26 PROCEDURE — 97110 THERAPEUTIC EXERCISES: CPT

## 2019-09-26 NOTE — PROGRESS NOTES
Roverto Vazquez  : 1960  Primary: [employer]  Secondary:  [address] at [facility]  [address], [address] [place] [address] Drive, [place], [facility] [address] [facility]  [phone]   [phone]        OUTPATIENT PHYSICAL THERAPY: Daily Treatment Note 2019  Visit Count:  Visit count could not be calculated. Make sure you are using a visit which is associated with an episode. Pre-treatment Symptoms/Complaints: Patient reports no  HA today. He states he is more conscious about head positioning while driving. Pain: Initial:   0/10 Post Session: 0 /10   Medications Last Reviewed:  2019  Updated Objective Findings:  Pt had better head positioning with all exer. TREATMENT:     MODALITIES: ( minutes) patient supine with knee wedge in place for moist heat and IFES to bilateral upper traps and scapular borders at 14 ma to help decrease tightness and help decrease migraine. Following manual ice pack to neck x 10 minutes. MANUAL THERAPY: ( minutes) Patient supine with knee wedge in place for STM to bilateral neck, upper traps, SCM, scalenes, and suboccipital areas followed by suboccipital release and gentle distraction. Gentle passive cervical rotation B and upper trap stretch B.    THERAPEUTIC EXERCISE: (40 minutes):  Exercises per grid below to improve mobility, strength, balance and coordination. Required maximal verbal cues to promote proper body alignment, promote proper body posture and promote proper body mechanics. Progressed range and complexity of movement as indicated.    Date:  19 Date:  19 Date:  9/10/19   Date  19 Date:  19 Date  19   Activity/Exercise Parameters Parameters Parameters      Neck stretch on wall   x3 modA        Active cervical rotation    5 x 5-10 second hold B       Active cervical side bend    5 x 5-10 second hold B       Nu step/UBE   10min 10min 5/5min 5/5min, L2   Foam roll   Static, head rot, swim,  Static, head rot str, punches Static, head rot,  Static, head rot   punches   On foam roll On foam roll, 1#, 2x10 On foam roll, 2#, 2x10 On counter in standing, 2x10   ER     On roll, 2x10 Standing, L1pink 3x4 on R, L2pink 2x10 on L   flex      PNF pattern, L1pink 3x4 on R, L2pink 2x10                              date    [facility] Portal  Treatment/Session Summary:    · Response to Treatment:  Emphasis more exer with today's session. Add unstable punches next visit. · Communication/Consultation:  None today  · Equipment provided today:  None today  · Recommendations/Intent for next treatment session: Next visit will focus on stretching, jnt mobs, strengthening.     Total Treatment Billable Duration:  40   minutes  PT Patient Time In/Time Out  Time In: 0930  Time Out: [address] [facility], [facility]    Future Appointments   Date Time Provider Ken Yu   9/30/2019  8:20 AM Medina Davila MD [facility]

## 2019-10-01 ENCOUNTER — HOSPITAL ENCOUNTER (OUTPATIENT)
Dept: PHYSICAL THERAPY | Age: 59
Discharge: HOME | End: 2019-10-01
Payer: COMMERCIAL

## 2019-10-01 PROCEDURE — 97110 THERAPEUTIC EXERCISES: CPT

## 2019-10-01 NOTE — PROGRESS NOTES
Desean Bangura  : 1960  Primary: [employer]  Secondary:  [address] [facility] [address], [address], [place], [address] [facility]  [phone]   [phone]        OUTPATIENT PHYSICAL THERAPY: Daily Treatment Note 10/1/2019  Visit Count:  6        Pre-treatment Symptoms/Complaints: Patient reports he feels himself getting better. Work is more tolerable. Pain: Initial:   0/10 Post Session: 0 /10   Medications Last Reviewed:  10/1/2019  Updated Objective Findings:  Pt more flexible with normal movement. TREATMENT:     MODALITIES: ( minutes) patient supine with knee wedge in place for moist heat and IFES to bilateral upper traps and scapular borders at 14 ma to help decrease tightness and help decrease migraine. Following manual ice pack to neck x 10 minutes. MANUAL THERAPY: ( minutes) Patient supine with knee wedge in place for STM to bilateral neck, upper traps, SCM, scalenes, and suboccipital areas followed by suboccipital release and gentle distraction. Gentle passive cervical rotation B and upper trap stretch B.    THERAPEUTIC EXERCISE: (40 minutes):  Exercises per grid below to improve mobility, strength, balance and coordination. Required maximal verbal cues to promote proper body alignment, promote proper body posture and promote proper body mechanics. Progressed range and complexity of movement as indicated.    Date:  19 Date:  19 Date:  9/10/19   Date  19 Date:  19 Date  19 Date  10/1/19   Activity/Exercise Parameters Parameters Parameters       Neck stretch on wall   x3 modA         Active cervical rotation    5 x 5-10 second hold B        Active cervical side bend    5 x 5-10 second hold B        Nu step/UBE   10min 10min 5/5min 5/5min, L2 4/4min L3   Foam roll   Static, head rot, swim,  Static, head rot str, punches Static, head rot,  Static, head rot Static, head rot 30sec,  S/s-1min   punches   On foam roll On foam roll, 1#, 2x10 On foam roll, 2#, 2x10 On counter in standing, 2x10 On plinth, 2x10   ER     On roll, 2x10 Standing, L1pink 3x4 on R, L2pink 2x10 on L Standing, yellow,  2x10    flex      PNF pattern, L1pink 3x4 on R, L2pink 2x10 Standing, yellow,  2x10   Ext PNF       Standing, pink,  2x10                       date    [employer] Portal  Treatment/Session Summary:    · Response to Treatment:  Pt improving with more technical exer. Will run experiments at work to discover more iritating motions. · Communication/Consultation:  None today  · Equipment provided today:  None today  · Recommendations/Intent for next treatment session: Next visit will focus on stretching, jnt mobs, strengthening.     Total Treatment Billable Duration:  40   minutes  PT Patient Time In/Time Out  Time In: 0930  Time Out: 14[facility], DPT    Future Appointments   Date Time Provider Ken Yu   10/3/2019  8:45 AM Edson Cancer, DPT [facility]   10/8/2019  8:45 AM Edson Cancer, DPT [facility]   10/10/2019  8:45 AM Edson Cancer, DPT [facility]   10/15/2019  8:45 AM Edson Cancer, DPT [facility]

## 2019-10-03 ENCOUNTER — HOSPITAL ENCOUNTER (OUTPATIENT)
Dept: PHYSICAL THERAPY | Age: 59
Discharge: HOME | End: 2019-10-03
Payer: COMMERCIAL

## 2019-10-03 PROCEDURE — 97140 MANUAL THERAPY 1/> REGIONS: CPT

## 2019-10-03 PROCEDURE — 97110 THERAPEUTIC EXERCISES: CPT

## 2019-10-03 NOTE — PROGRESS NOTES
Rojean Barthel  : 1960  Primary: [employer]  Secondary:  [address]  at [facility]  [address], [place], [facility] [address] [facility]  [phone]   [phone]        OUTPATIENT PHYSICAL THERAPY: Daily Treatment Note 10/3/2019  Visit Count:  7        Pre-treatment Symptoms/Complaints: Patient reports he moved entire house in 8 hrs yesterday with brother and only felt tired. He did not have any symptoms. Pain: Initial:   0/10 Post Session: 0 /10   Medications Last Reviewed:  10/3/2019  Updated Objective Findings:  See d/c note  TREATMENT:     MODALITIES: ( minutes) patient supine with knee wedge in place for moist heat and IFES to bilateral upper traps and scapular borders at 14 ma to help decrease tightness and help decrease migraine. Following manual ice pack to neck x 10 minutes. MANUAL THERAPY: ( 8minutes) Patient supine with knee wedge in place for STM to bilateral neck, upper traps, SCM, scalenes, and suboccipital areas followed by suboccipital release and gentle distraction. Gentle passive cervical rotation B and upper trap stretch B.    THERAPEUTIC EXERCISE: (25 minutes):  Exercises per grid below to improve mobility, strength, balance and coordination. Required maximal verbal cues to promote proper body alignment, promote proper body posture and promote proper body mechanics. Progressed range and complexity of movement as indicated.    Date:  19 Date:  19 Date:  9/10/19   Date  19 Date:  19 Date  19 Date  10/1/19 Date;  10/3/19   Activity/Exercise Parameters Parameters Parameters        Neck stretch on wall   x3 modA          Active cervical rotation    5 x 5-10 second hold B         Active cervical side bend    5 x 5-10 second hold B         Nu step/UBE   10min 10min 5/5min 5/5min, L2 4/4min L3 5/5min L3   Foam roll   Static, head rot, swim,  Static, head rot str, punches Static, head rot,  Static, head rot Static, head rot 30sec,  S/s-1min    punches   On foam roll On foam roll, 1#, 2x10 On foam roll, 2#, 2x10 On counter in standing, 2x10 On plinth, 2x10 On plinth, x15   ER     On roll, 2x10 Standing, L1pink 3x4 on R, L2pink 2x10 on L Standing, yellow,  2x10  Standing, yellow,  x15   flex      PNF pattern, L1pink 3x4 on R, L2pink 2x10 Standing, yellow,  2x10 Standing, yellow,  x15    Ext PNF       Standing, pink,  2x10 Standing, pink, x15                             [employer] Portal  Treatment/Session Summary:    · Response to Treatment:  Pt improved with PROM in cervical segments and AROM in bilat shldrs. · Communication/Consultation:  None today  · Equipment provided today:  None today  · Recommendations/Intent for next treatment session: Next visit will focus on stretching, jnt mobs, strengthening. Total Treatment Billable Duration:  40   minutes  PT Patient Time In/Time Out  Time In: 0845  Time Out: [address], DPT    No future appointments.

## 2019-10-03 NOTE — THERAPY DISCHARGE
Angel Dietz  : 1960  Primary: [facility]  Secondary:  [address]  [facility] [address] [facility], [place], [facility]  [phone]   [phone]          OUTPATIENT PHYSICAL THERAPY:Discharge Summary 10/3/2019   ICD-10: Treatment Diagnosis: neck pain (M54.2)  ICD-10: Treatment Diagnosis 2: radiculopathy, cervical region (M54.12)  Low back pain (M54.5)  Precautions/Allergies:   Patient has no known allergies. TREATMENT PLAN:  Effective Dates: 2019 TO 2019 (90 days). Frequency/Duration: 2 times a week for 90 Day(s) MEDICAL/REFERRING DIAGNOSIS:  Neck sprain [S13. 9XXA]  Shoulder sprain [W65.142F]   DATE OF ONSET: 8/10/19  REFERRING PHYSICIAN: Windy Teixeira MD Orders: eval/treat  Return MD Appointment: unknown     INITIAL ASSESSMENT:    Angel Dietz has been seen in physical therapy from 19 to 10/3/19 for 7 visits. Treatment has been discontinued at this time due to pt meeting all goals and completed POC. The below goals were met prior to discontinuation. Thank you for this referral.   PROBLEM LIST (Impacting functional limitations):  1. Decreased Strength  2. Decreased ADL/Functional Activities  3. Decreased Transfer Abilities  4. Decreased Ambulation Ability/Technique  5. Increased Pain  6. Decreased Activity Tolerance  7. Increased Fatigue  8. Increased Shortness of Breath  9. Decreased Flexibility/Joint Mobility INTERVENTIONS PLANNED: (Treatment may consist of any combination of the following)  1. Cryotherapy  2. Electrical Stimulation  3. Home Exercise Program (HEP)  4. Manual Therapy  5. Neuromuscular Re-education/Strengthening  6. Range of Motion (ROM)  7. Therapeutic Activites  8. Ultrasound (US)     GOALS: (Goals have been discussed and agreed upon with patient.)         1.   Pt will be compliant and independent with HEP in order to increase spinal mobility and UE with core strength/endurance to improve quality of life. GOAL MET 10/3/19  2. Pt will reduce score on NDI to 5/50 in order to demonstrate improved functional mobility and quality of life. GOAL MET 10/3/19  3. Pt will report working for 8 hr shift with minimal interference. GOAL MET 10/3/19  4. Patient to demonstrate increased ROM of 66% in all cervical motions. GOAL MET 10/3/19    OUTCOME MEASURE:   Tool Used: Disabilities of the Arm, Shoulder and Hand (DASH) Questionnaire - Quick Version  Score:  Initial: 19/55  Most Recent: 12/55 (Date:10/3/1- -- )   Interpretation of Score: The DASH is designed to measure the activities of daily living in person's with upper extremity dysfunction or pain. Each section is scored on a 1-5 scale, 5 representing the greatest disability. The scores of each section are added together for a total score of 55. Tool Used: Neck Disability Index (NDI)  Score:  Initial: 17/50  Most Recent: 4/50 (Date: 10/13/19 )   Interpretation of Score: The Neck Disability Index is a revised form of the Oswestry Low Back Pain Index and is designed to measure the activities of daily living in person's with neck pain. Each section is scored on a 0-5 scale, 5 representing the greatest disability. The scores of each section are added together for a total score of 50.   *3 answers corrected after discussion    MEDICAL NECESSITY:   · Skilled intervention continues to be required due to decreassed function. REASON FOR SERVICES/OTHER COMMENTS:  · Patient continues to require skilled intervention due to medical complications and patient unable to attend/participate in therapy as expected. Total Duration:  PT Patient Time In/Time Out  Time In: 0845  Time Out: 0945    Rehabilitation Potential For Stated Goals: Fair  Regarding Delaney Armendariz's therapy, I certify that the treatment plan above will be carried out by a therapist or under their direction.   Thank you for this referral,  Evertt Krabbe, DPT     Referring Physician Signature: Asael Eric _______________________________ Date _____________     PAIN/SUBJECTIVE:   Initial:   0/10 Post Session:  0/10   HISTORY:   History of Injury/Illness (Reason for Referral):  Pt reports he was at work when having door fall onto him striking him in the R neck/shld/head region on 8-10-19. Since then he has had pian in neck/shld along with HAs in forehead and on top. Went to MD after incident and treated with meds. Pt reports he is continuing to work and is very uncomfortable. Past Medical History/Comorbidities:   Mr. Hanna Lindsay  has a past medical history of Arthritis, Diabetes ([address] ED (erectile dysfunction) (9/14/2015), GERD (gastroesophageal reflux disease), Gout (07/17/2015), History of colon polyps (2/28/2017), Hypertension, Left foot pain (7/17/2015), Mixed hyperlipidemia (7/20/2015), PUD (peptic ulcer disease), and Unspecified vitamin D deficiency (07/17/2015). He also has no past medical history of Difficult intubation, Malignant hyperthermia due to anesthesia, Nausea & vomiting, or Pseudocholinesterase deficiency. Mr. Hanna Lindsay  has a past surgical history that includes pr colsc flx w/rmvl of tumor polyp lesion snare tq (9/26/2016); colonoscopy (N/A, 9/26/2016); hx orthopaedic; pr colonoscopy flx dx w/collj spec when pfrmd (9/23/2019); and colonoscopy (N/A, 9/23/2019). Social History/Living Environment:     pt lives with spouse in house,   Prior Level of Function/Work/Activity:  Pt works as  including lifting over 20lbs, squatting, bending, assisting others with same motions.    Dominant Side:         RIGHT   Ambulatory/Rehab Services H2 Model Falls Risk Assessment   Risk Factors:       (1)  Gender [Male]       (2)  Any administered antiepileptics/anticonvulsants       (1)  Any administered benzodiazepines Ability to Rise from Chair:       (1)  Pushes up, successful in one attempt   Falls Prevention Plan:       No modifications necessary   Total: (5 or greater = High Risk): 5   ©2010 [facility] of [employer]. All Rights Reserved. [place] States Patent #[id number]. Federal Law prohibits the replication, distribution or use without written permission from [facility] [employer]   Current Medications:       Current Outpatient Medications:     atorvastatin (LIPITOR) 40 mg tablet, Take 1 Tab by mouth nightly. Indications: combined high blood cholesterol and triglyceride level, Disp: 90 Tab, Rfl: 3    ezetimibe (ZETIA) 10 mg tablet, Take 1 Tab by mouth daily. , Disp: 30 Tab, Rfl: 5    tiZANidine (ZANAFLEX) 4 mg tablet, Take 1 Tab by mouth every six (6) hours as needed (muscle spasms). Indications: muscle spasm, Disp: 60 Tab, Rfl: 5    ibuprofen (MOTRIN) 600 mg tablet, Take 1 Tab by mouth every six (6) hours as needed for Pain. Indications: pain, Disp: 90 Tab, Rfl: 5    empagliflozin-linagliptin (GLYXAMBI) 25-5 mg tab, Take 1 Tab by mouth daily. Indications: type 2 diabetes mellitus, Disp: 630 Tab, Rfl: 3    chlorthalidone (HYGROTEN) 50 mg tablet, TAKE 1 TABLET BY MOUTH DAILY. INDICATIONS: HYPERTENSION, Disp: 90 Tab, Rfl: 1    prucalopride (MOTEGRITY) 2 mg tab, Take 1 Tab by mouth daily. For constipation from gastroparesis from diabetes, Disp: 30 Each, Rfl: 5    simethicone 250 mg cap, Take 1 Cap by mouth two (2) times a day. Bloating and abdominal pain, Disp: 60 Cap, Rfl: 5    OTHER, PAPAVERINE 30 mg + PHENTOLAMINE 3 mg + PROSTAGLANDIN 60 mcg/ml inject 15 units intracavernosal 10 min prior to sex, Disp: 2.6 mL, Rfl: 11    glyBURIDE (DIABETA) 5 mg tablet, TAKE 1 TABLET BY MOUTH TWO (2) TIMES DAILY (WITH MEALS). INDICATIONS: TYPE 2 DIABETES MELLITUS, Disp: , Rfl: 5    allopurinol (ZYLOPRIM) 300 mg tablet, TAKE 1 TABLET BY MOUTH DAILY. INDICATIONS: GOUT (Patient taking differently: Per anesthesia protocol:instructed to take am of surgery.), Disp: 90 Tab, Rfl: 1    ergocalciferol (ERGOCALCIFEROL) 50,000 unit capsule, TAKE 1 CAP BY MOUTH EVERY SEVEN (7) DAYS.  INDICATIONS: VITAMIN D DEFICIENCY, Disp: 4 Cap, Rfl: 5    levocetirizine (XYZAL) 5 mg tablet, Take 1 Tab by mouth daily. , Disp: 30 Tab, Rfl: 5    metFORMIN (GLUCOPHAGE) 1,000 mg tablet, TAKE 1 TABLET BY MOUTH TWICE A DAY WITH MEALS FOR 90 DAYS, Disp: 180 Tab, Rfl: 3    amLODIPine (NORVASC) 10 mg tablet, Take 1 Tab by mouth daily. (Patient taking differently: Take 10 mg by mouth daily. Per anesthesia protocol:instructed to take am of surgery. Indications: high blood pressure), Disp: 90 Tab, Rfl: 3    linaclotide (LINZESS) 145 mcg cap capsule, Take 1 Cap by mouth Daily (before breakfast). Indications: chronic idiopathic constipation, Disp: 30 Cap, Rfl: 5    pregabalin (LYRICA) 75 mg capsule, Take 1 Cap by mouth three (3) times daily. Max Daily Amount: 225 mg., Disp: 90 Cap, Rfl: 5   Date Last Reviewed:  10/3/2019     Number of Personal Factors/Comorbidities that affect the Plan of Care: 1-2: MODERATE COMPLEXITY   EXAMINATION:   Palpation:          PROM of [address] jnt and all cervical segments WNL   ROM:            Range of Motion Tight end ranges Tight end ranges           Joint: Date: 8/29/19  Date:10/3/19   Date:       Right Left Right Left Right Left   cerv              , rot  10%  33  100 100        SB 10%  33  100 100       B bend  10    100         Fwd bend  50    100                         flex 120 120 160 160     abduction 125 125 160 160      ER  60 80  70 80       IR 45 80 80 80     Strength:          STRENGTH               Joint: Date:8/29/2019   Date:10/3/19   Date:       Right Left Right Left Right Left   flex 3-/5 3-/5  5/5 5/5       abduction 3-/5 3-/5  5/5 5/5       ER 2/5 3/5  4/5 5/5       IR 2/5 3/5  4/5 5/5                            Body Structures Involved:  1. Nerves  2. Eyes and Ears  3. Bones  4. Joints  5. Muscles  6. Ligaments Body Functions Affected:  1. Mental  2. Sensory/Pain  3. Neuromusculoskeletal  4. Movement Related Activities and Participation Affected:  1.  General Tasks and Demands  2. Mobility  3. Self Care  4. Domestic Life  5.  Community, Social and [place]   Number of elements (examined above) that affect the Plan of Care: 3: MODERATE COMPLEXITY   CLINICAL PRESENTATION:   Presentation: Evolving clinical presentation with changing clinical characteristics: MODERATE COMPLEXITY   CLINICAL DECISION MAKING:   Use of outcome tool(s) and clinical judgement create a POC that gives a: Questionable prediction of patient's progress: MODERATE COMPLEXITY

## 2019-10-08 ENCOUNTER — APPOINTMENT (OUTPATIENT)
Dept: PHYSICAL THERAPY | Age: 59
End: 2019-10-08
Payer: COMMERCIAL

## 2019-10-10 ENCOUNTER — APPOINTMENT (OUTPATIENT)
Dept: PHYSICAL THERAPY | Age: 59
End: 2019-10-10
Payer: COMMERCIAL

## 2019-10-15 ENCOUNTER — APPOINTMENT (OUTPATIENT)
Dept: PHYSICAL THERAPY | Age: 59
End: 2019-10-15
Payer: COMMERCIAL

## 2019-10-21 PROBLEM — M20.11 HALLUX VALGUS (ACQUIRED), RIGHT FOOT: Status: ACTIVE | Noted: 2019-10-21

## 2019-10-21 PROBLEM — K59.09 CHRONIC CONSTIPATION: Status: ACTIVE | Noted: 2019-10-21

## 2019-10-21 PROBLEM — B35.1 DERMATOPHYTOSIS OF NAIL: Status: ACTIVE | Noted: 2019-10-21

## 2019-10-21 PROBLEM — L85.3 XEROSIS CUTIS: Status: ACTIVE | Noted: 2019-10-21

## 2019-10-21 PROBLEM — L60.3 NAIL DYSTROPHY: Status: ACTIVE | Noted: 2019-10-21

## 2020-06-11 ENCOUNTER — HOSPITAL ENCOUNTER (OUTPATIENT)
Dept: GENERAL RADIOLOGY | Age: 60
Discharge: HOME | End: 2020-06-11
Payer: COMMERCIAL

## 2020-06-11 DIAGNOSIS — R07.89 MUSCULOSKELETAL CHEST PAIN: ICD-10-CM
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
## 2020-06-11 PROCEDURE — 71046 X-RAY EXAM CHEST 2 VIEWS: CPT

## 2020-06-12 NOTE — PROGRESS NOTES
Chest xray is negative for any rib fractures. Have sent tramadol to the pharmacy. He will continue take the tizanidine as well.

## 2020-11-24 PROBLEM — R74.8 ELEVATED ALKALINE PHOSPHATASE LEVEL: Status: ACTIVE | Noted: 2020-11-24

## 2021-03-22 NOTE — DISCHARGE INSTRUCTIONS
Gail Newman M.D.  [phone]    Instructions following colonoscopy:    ACTIVITY:   Resume usual, basic activities around the house today.  You may be light-headed or sleepy from anesthesia, so be careful going up and down stairs.  Avoid driving, operating machinery, or signing documents for 24 hours.  No alcohol for the rest of the day. DIET:   No restriction. No alcohol for 24 hours after procedure. Please note, some people may have nausea or cramps after this procedure which can result in an upset stomach after eating.  Many people have loose stools or diarrhea immediately after colonoscopy. It is also not uncommon to not have a bowel movement for 2-3 days. PAIN:   Some cramping or gas pain is normal after colonoscopy. However, if you experience worsening pain over the course of the day, or pain with associated fever please call the office immediately      [address] IF:   You have a temperature higher than 101.5° Fahrenheit for more than 6 hours.  You have severe nausea or vomiting not relieved by medication; or diarrhea. Follow up on pathology. Repeat colonoscopy in 5 years. Otherwise, continue home medications as previously prescribed.
Oriented - self; Oriented - place; Oriented - time

## 2021-12-03 PROBLEM — M21.611 BILATERAL BUNIONS: Status: ACTIVE | Noted: 2021-12-03

## 2021-12-03 PROBLEM — M79.671 BILATERAL FOOT PAIN: Status: ACTIVE | Noted: 2021-12-03

## 2021-12-03 PROBLEM — M21.612 BILATERAL BUNIONS: Status: ACTIVE | Noted: 2021-12-03

## 2021-12-03 PROBLEM — M21.41 FLAT FEET, BILATERAL: Status: ACTIVE | Noted: 2021-12-03

## 2021-12-03 PROBLEM — M21.42 FLAT FEET, BILATERAL: Status: ACTIVE | Noted: 2021-12-03

## 2021-12-03 PROBLEM — M79.672 BILATERAL FOOT PAIN: Status: ACTIVE | Noted: 2021-12-03

## 2022-03-18 PROBLEM — K57.30 DIVERTICULOSIS OF LARGE INTESTINE: Status: ACTIVE | Noted: 2017-07-26

## 2022-03-18 PROBLEM — E11.21 TYPE 2 DIABETES WITH NEPHROPATHY (HCC): Status: ACTIVE | Noted: 2018-05-08

## 2022-03-18 PROBLEM — K21.9 GASTROESOPHAGEAL REFLUX DISEASE WITHOUT ESOPHAGITIS: Status: ACTIVE | Noted: 2017-02-28

## 2022-03-18 PROBLEM — B35.1 DERMATOPHYTOSIS OF NAIL: Status: ACTIVE | Noted: 2019-10-21

## 2022-03-18 PROBLEM — M21.611 BILATERAL BUNIONS: Status: ACTIVE | Noted: 2021-12-03

## 2022-03-18 PROBLEM — M21.612 BILATERAL BUNIONS: Status: ACTIVE | Noted: 2021-12-03

## 2022-03-19 PROBLEM — M79.672 BILATERAL FOOT PAIN: Status: ACTIVE | Noted: 2021-12-03

## 2022-03-19 PROBLEM — K31.84 GASTROPARESIS DUE TO DM (HCC): Status: ACTIVE | Noted: 2019-06-25

## 2022-03-19 PROBLEM — G47.26 SHIFT WORK SLEEP DISORDER: Status: ACTIVE | Noted: 2017-04-04

## 2022-03-19 PROBLEM — R14.0 BLOATING: Status: ACTIVE | Noted: 2019-06-25

## 2022-03-19 PROBLEM — Z87.39 HISTORY OF GOUT: Status: ACTIVE | Noted: 2017-02-28

## 2022-03-19 PROBLEM — Z86.010 HISTORY OF COLON POLYPS: Status: ACTIVE | Noted: 2017-02-28

## 2022-03-19 PROBLEM — M21.41 FLAT FEET, BILATERAL: Status: ACTIVE | Noted: 2021-12-03

## 2022-03-19 PROBLEM — Z86.0100 HISTORY OF COLON POLYPS: Status: ACTIVE | Noted: 2017-02-28

## 2022-03-19 PROBLEM — L85.3 XEROSIS CUTIS: Status: ACTIVE | Noted: 2019-10-21

## 2022-03-19 PROBLEM — M20.11 HALLUX VALGUS (ACQUIRED), RIGHT FOOT: Status: ACTIVE | Noted: 2019-10-21

## 2022-03-19 PROBLEM — M21.42 FLAT FEET, BILATERAL: Status: ACTIVE | Noted: 2021-12-03

## 2022-03-19 PROBLEM — K64.8 INTERNAL HEMORRHOIDS: Status: ACTIVE | Noted: 2017-07-26

## 2022-03-19 PROBLEM — R10.32 LLQ ABDOMINAL PAIN: Status: ACTIVE | Noted: 2019-06-25

## 2022-03-19 PROBLEM — E11.43 GASTROPARESIS DUE TO DM (HCC): Status: ACTIVE | Noted: 2019-06-25

## 2022-03-19 PROBLEM — D50.9 IRON DEFICIENCY ANEMIA: Status: ACTIVE | Noted: 2018-05-09

## 2022-03-19 PROBLEM — M79.671 BILATERAL FOOT PAIN: Status: ACTIVE | Noted: 2021-12-03

## 2022-03-20 PROBLEM — K59.09 CHRONIC CONSTIPATION: Status: ACTIVE | Noted: 2019-10-21

## 2022-03-20 PROBLEM — R74.8 ELEVATED ALKALINE PHOSPHATASE LEVEL: Status: ACTIVE | Noted: 2020-11-24

## 2022-03-20 PROBLEM — L60.3 NAIL DYSTROPHY: Status: ACTIVE | Noted: 2019-10-21

## 2022-04-27 ENCOUNTER — NURSE TRIAGE (OUTPATIENT)
Dept: OTHER | Facility: CLINIC | Age: 62
End: 2022-04-27

## 2022-04-28 PROBLEM — E11.65 POORLY CONTROLLED DIABETES MELLITUS (HCC): Status: ACTIVE | Noted: 2022-04-28

## 2022-08-29 ENCOUNTER — TELEMEDICINE (OUTPATIENT)
Dept: PRIMARY CARE CLINIC | Facility: CLINIC | Age: 62
End: 2022-08-29
Payer: COMMERCIAL

## 2022-08-29 DIAGNOSIS — M25.561 CHRONIC PAIN OF BOTH KNEES: ICD-10-CM

## 2022-08-29 DIAGNOSIS — E78.2 MIXED HYPERLIPIDEMIA: ICD-10-CM

## 2022-08-29 DIAGNOSIS — E55.9 VITAMIN D DEFICIENCY: ICD-10-CM

## 2022-08-29 DIAGNOSIS — E11.65 TYPE 2 DIABETES MELLITUS WITH HYPERGLYCEMIA, WITHOUT LONG-TERM CURRENT USE OF INSULIN (HCC): Primary | ICD-10-CM

## 2022-08-29 DIAGNOSIS — I10 ESSENTIAL (PRIMARY) HYPERTENSION: ICD-10-CM

## 2022-08-29 DIAGNOSIS — Z11.59 NEED FOR HEPATITIS C SCREENING TEST: ICD-10-CM

## 2022-08-29 DIAGNOSIS — M25.562 CHRONIC PAIN OF BOTH KNEES: ICD-10-CM

## 2022-08-29 DIAGNOSIS — G89.29 CHRONIC PAIN OF BOTH KNEES: ICD-10-CM

## 2022-08-29 DIAGNOSIS — Z11.4 ENCOUNTER FOR SCREENING FOR HIV: ICD-10-CM

## 2022-08-29 DIAGNOSIS — Z79.899 ENCOUNTER FOR LONG-TERM (CURRENT) USE OF MEDICATIONS: ICD-10-CM

## 2022-08-29 PROCEDURE — 3046F HEMOGLOBIN A1C LEVEL >9.0%: CPT | Performed by: FAMILY MEDICINE

## 2022-08-29 PROCEDURE — 3017F COLORECTAL CA SCREEN DOC REV: CPT | Performed by: FAMILY MEDICINE

## 2022-08-29 PROCEDURE — G8427 DOCREV CUR MEDS BY ELIG CLIN: HCPCS | Performed by: FAMILY MEDICINE

## 2022-08-29 PROCEDURE — 99214 OFFICE O/P EST MOD 30 MIN: CPT | Performed by: FAMILY MEDICINE

## 2022-08-29 PROCEDURE — 2022F DILAT RTA XM EVC RTNOPTHY: CPT | Performed by: FAMILY MEDICINE

## 2022-08-29 RX ORDER — IBUPROFEN 600 MG/1
600 TABLET ORAL EVERY 6 HOURS PRN
Qty: 90 TABLET | Refills: 5 | Status: SHIPPED | OUTPATIENT
Start: 2022-08-29

## 2022-08-29 NOTE — PROGRESS NOTES
Chung Mishra M.D.  [address] [facility]  Phone:  [phone]  Fax:  [phone]          I was in the office while conducting this encounter. The patient was at home. CHIEF COMPLAINT:  Chief Complaint   Patient presents with    Diabetes     He has been taking the Ozempic. Patient says his blood sugars are improving. He still has some high days. 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  His A1c in April was 13.5. He had his eyes checked earlier this year at [employer] [facility]. Hypertension     His blood pressure has been under control. He takes his medications daily as prescribed. Knee Pain     Patient states that he injured his knees recently on his job. Something fell onto him from his truck. The [facility] doctor gave him ibuprofen which has helped the pain. He is asking for a refill. Immunizations     He had both of the Shingrix vaccines this summer. He is unsure of the dates. HISTORY OF PRESENT ILLNESS:  Mr. Lata Ko is a 60 y.o. male  who presents for follow up. He still has some high days. 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  His A1c in April was 13.5. He had his eyes checked earlier this year at [employer] [facility]. His blood pressure has been under control. He takes his medications daily as prescribed. Denies chest pain, shortness of breath, headaches or blurred vision. Patient states that he injured his knees recently on his job. Something fell onto him from his truck. The [employer] doctor gave him ibuprofen which has helped the pain. He is asking for a refill. He had both of the Shingrix vaccines this summer. He is unsure of the dates. No other complaints. Taking medications as prescribed. Medications reviewed and updated. HISTORY:  No Known Allergies      REVIEW OF SYSTEMS:  Review of systems is as indicated in HPI otherwise negative.     PHYSICAL EXAM:    Vital Signs: (As obtained by patient/caregiver at home)  No flowsheet data found.         Constitutional: [x] Appears well-developed and well-nourished [x] No apparent distress      [] Abnormal -     Mental status: [x] Alert and awake  [x] Oriented to person/place/time [x] Able to follow commands    [] Abnormal -     Eyes:   EOM    [x]  Normal    [] Abnormal -   Sclera  [x]  Normal    [] Abnormal -          Discharge [x]  None visible   [] Abnormal -     HENT: [x] Normocephalic, atraumatic  [] Abnormal -   [x] Mouth/Throat: Mucous membranes are moist    External Ears [x] Normal  [] Abnormal -    Neck: [x] No visualized mass [] Abnormal -     Pulmonary/Chest: [x] Respiratory effort normal   [x] No visualized signs of difficulty breathing or respiratory distress             Musculoskeletal:   [x] Normal gait with no signs of ataxia         [x] Normal range of motion of neck        [] Abnormal -     Neurological:        [x] No Facial Asymmetry (Cranial nerve 7 motor function) (limited exam due to video visit)          [x] No gaze palsy        [] Abnormal -          Skin:        [x] No significant exanthematous lesions or discoloration noted on facial skin         [] Abnormal -            Psychiatric:       [x] Normal Affect [] Abnormal -        [x] No Hallucinations    PHQ:  PHQ-9  4/28/2022   Little interest or pleasure in doing things 0   Total Score PHQ 2 0       LABS    Office Visit on 04/28/2022   Component Date Value Ref Range Status    WBC 04/28/2022 8.6  3.4 - 10.8 x10E3/uL Final    RBC 04/28/2022 4.35  4.14 - 5.80 x10E6/uL Final    Hemoglobin 04/28/2022 13.5  13.0 - 17.7 g/dL Final    Hematocrit 04/28/2022 39.5  37.5 - 51.0 % Final    MCV 04/28/2022 91  79 - 97 fL Final    MCH 04/28/2022 31.0  26.6 - 33.0 pg Final    MCHC 04/28/2022 34.2  31.5 - 35.7 g/dL Final    RDW 04/28/2022 10.6 (A) 11.6 - 15.4 % Final    Platelets 44/86/2599 282  150 - 450 x10E3/uL Final    Neutrophils % 04/28/2022 65  Not Estab. % Final    Lymphocytes % 04/28/2022 23  Not Estab. % Final    Monocytes % 04/28/2022 6  Not Estab. % Final    Eosinophils % 04/28/2022 5  Not Estab. % Final    Basophils % 04/28/2022 1  Not Estab. % Final    Neutrophils Absolute 04/28/2022 5.6  1.4 - 7.0 x10E3/uL Final    Lymphocytes Absolute 04/28/2022 2.0  0.7 - 3.1 x10E3/uL Final    Monocytes Absolute 04/28/2022 0.5  0.1 - 0.9 x10E3/uL Final    Eosinophils Absolute 04/28/2022 0.5 (A) 0.0 - 0.4 x10E3/uL Final    Basophils Absolute 04/28/2022 0.1  0.0 - 0.2 x10E3/uL Final    Immature Granulocytes 04/28/2022 0  Not Estab. % Final    GRANULOCYTE ABSOLUTE COUNT 04/28/2022 0.0  0.0 - 0.1 x10E3/uL Final    Glucose 04/28/2022 444 (A) 65 - 99 mg/dL Final    BUN 04/28/2022 28 (A) 8 - 27 mg/dL Final    Creatinine 04/28/2022 1.40 (A) 0.76 - 1.27 mg/dL Final    EGFR 04/28/2022 56 (A) >59 mL/min/1.73 Final    Bun/Cre Ratio 04/28/2022 20  10 - 24 NA Final    Sodium 04/28/2022 132 (A) 134 - 144 mmol/L Final    Potassium 04/28/2022 4.8  3.5 - 5.2 mmol/L Final    Chloride 04/28/2022 85 (A) 96 - 106 mmol/L Final    CO2 04/28/2022 30 (A) 20 - 29 mmol/L Final    Calcium 04/28/2022 10.6 (A) 8.6 - 10.2 mg/dL Final    Total Protein 04/28/2022 7.5  6.0 - 8.5 g/dL Final    Albumin 04/28/2022 4.3  3.8 - 4.8 g/dL Final    Globulin, Total 04/28/2022 3.2  1.5 - 4.5 g/dL Final    Albumin/Globulin Ratio 04/28/2022 1.3  1.2 - 2.2 NA Final    Total Bilirubin 04/28/2022 0.5  0.0 - 1.2 mg/dL Final    Alkaline Phosphatase 04/28/2022 157 (A) 44 - 121 IU/L Final    AST 04/28/2022 9  0 - 40 IU/L Final    ALT 04/28/2022 10  0 - 44 IU/L Final    Vit D, 25-Hydroxy 04/28/2022 25.2 (A) 30.0 - 100.0 ng/mL Final    Comment: Vitamin D deficiency has been defined by the [place] of  Medicine and an Endocrine Society practice guideline as a  level of serum 25-OH vitamin D less than 20 ng/mL (1,2). The Endocrine Society went on to further define vitamin D  insufficiency as a level between 21 and 29 ng/mL (2). 1. IOM ([place] of Medicine). 2010. Dietary reference     intakes for calcium and D. [facility]: The     [employer]. 2. Robert MF, Mackenzie NC, Donita ROWE, et al.     Evaluation, treatment, and prevention of vitamin D     deficiency: an Endocrine Society clinical practice     guideline. JCEM. 2011 Jul; 96(7):1911-30. Creatinine, Ur 04/28/2022 121.9  Not Estab. mg/dL Final    Microalb, Ur 04/28/2022 101.9  Not Estab. ug/mL Final    Microalbumin Creatinine Ratio 04/28/2022 84 (A) 0 - 29 mg/g creat Final    Comment:                        Normal:                0 -  29                         Moderately increased: 30 - 300                         Severely increased:       >300      Hemoglobin A1C 04/28/2022 13.5 (A) 4.8 - 5.6 % Final    Comment:          Prediabetes: 5.7 - 6.4           Diabetes: >6.4           Glycemic control for adults with diabetes: <7.0      Cholesterol, Total 04/28/2022 238 (A) 100 - 199 mg/dL Final    Triglycerides 04/28/2022 154 (A) 0 - 149 mg/dL Final    HDL 04/28/2022 48  >39 mg/dL Final    VLDL 04/28/2022 28  5 - 40 mg/dL Final    LDL Calculated 04/28/2022 162 (A) 0 - 99 mg/dL Final    LDl/HDL Ratio 04/28/2022 3.4  0.0 - 3.6 ratio Final    Comment:                                     LDL/HDL Ratio                                              Men  Women                                1/2 Avg. Risk  1.0    1.5                                    Avg.Risk  3.6    3.2                                 2X Avg. Risk  6.2    5.0                                 3X Avg. Risk  8.0    6.1         IMPRESSION/PLAN     Diagnosis Orders   1. Type 2 diabetes mellitus with hyperglycemia, without long-term current use of insulin (HCC)  CBC with Auto Differential    Comprehensive Metabolic Panel    Hemoglobin A1C      2. Essential (primary) hypertension  CBC with Auto Differential    Comprehensive Metabolic Panel      3. Chronic pain of both knees  ibuprofen (ADVIL;MOTRIN) 600 MG tablet      4.  Mixed hyperlipidemia Lipid Panel      5. Vitamin D deficiency  Vitamin D 25 Hydroxy      6. Need for hepatitis C screening test  Hepatitis C Antibody      7. Encounter for screening for HIV  HIV 1/2 Ag/Ab, 4TH Generation,W Rflx Confirm      8. Encounter for long-term (current) use of medications  CBC with Auto Differential    Comprehensive Metabolic Panel          Follow up and Dispositions:  Return in about 4 months (around 12/29/2022) for [address]. Patient will continue current medications. Refilled the above medications. Reviewed medications and side effects in detail. Will check the above labs. Reviewed most recent labs. Reviewed diet, exercise and weight control. Cardiovascular risks and recommendations reviewed. Patient encouraged to follow a diabetic/low sodium diet. Patient will check blood sugars once daily. Consent: This patient and/or their healthcare decision maker is aware that this patient-initiated Telehealth encounter is a billable service, with coverage as determined by their insurance carrier. Patient is aware that they may receive a bill and has provided verbal consent to proceed: Yes     The patient is being evaluated by a Virtual Visit (video visit) encounter to address concerns as mentioned above. A caregiver was present when appropriate. Due to this being a TeleHealth encounter (During QQYSC-14 public health emergency), evaluation of the following organ systems was limited: Vitals/Constitutional/EENT/Resp/CV/GI//MS/Neuro/Skin/Heme-Lymph-Imm. Pursuant to the emergency declaration under the [facility], [facility] authority and the [employer] Act, this Virtual Visit was conducted with patient's (and/or legal guardian's) consent, to reduce the patient's risk of exposure to COVID-19 and provide necessary medical care.   The patient (and/or legal guardian) has also been advised to contact this office for worsening conditions or problems, and seek emergency medical treatment and/or call 911 if deemed necessary. Patient identification was verified at the start of the visit: YES    Services were provided through a video synchronous discussion virtually to substitute for in-person clinic visit. Patient and provider were located at their individual homes. Maureen Larsen, was evaluated through a synchronous (real-time) audio-video encounter. The patient (or guardian if applicable) is aware that this is a billable service, which includes applicable co-pays. This Virtual Visit was conducted with patient's (and/or legal guardian's) consent. The visit was conducted pursuant to the emergency declaration under the [address] [place] [address], [address] [place] [address] authority and the [employer] and [employer] Act. Patient identification was verified, and a caregiver was present when appropriate. The patient was located at Home: [address] [address]. Provider was located at [address]): [place] [address].. Total Time: minutes: 11-20 minutes. Dictated using voice recognition software.  Proofread, but unrecognized voice recognition errors may exist.    Kathi Farrar MD  08/29/22

## 2022-09-12 DIAGNOSIS — E11.65 TYPE 2 DIABETES MELLITUS WITH HYPERGLYCEMIA, WITHOUT LONG-TERM CURRENT USE OF INSULIN (HCC): ICD-10-CM

## 2022-09-12 DIAGNOSIS — Z79.899 ENCOUNTER FOR LONG-TERM (CURRENT) USE OF MEDICATIONS: ICD-10-CM

## 2022-09-12 DIAGNOSIS — Z11.4 ENCOUNTER FOR SCREENING FOR HIV: ICD-10-CM

## 2022-09-12 DIAGNOSIS — E55.9 VITAMIN D DEFICIENCY: ICD-10-CM

## 2022-09-12 DIAGNOSIS — Z11.59 NEED FOR HEPATITIS C SCREENING TEST: ICD-10-CM

## 2022-09-12 DIAGNOSIS — I10 ESSENTIAL (PRIMARY) HYPERTENSION: ICD-10-CM

## 2022-09-12 DIAGNOSIS — E78.2 MIXED HYPERLIPIDEMIA: ICD-10-CM

## 2022-09-12 LAB
BASOPHILS # BLD: 0.1 K/UL (ref 0–0.2)
BASOPHILS NFR BLD: 1 % (ref 0–2)
DIFFERENTIAL METHOD BLD: ABNORMAL
EOSINOPHIL # BLD: 0.5 K/UL (ref 0–0.8)
EOSINOPHIL NFR BLD: 8 % (ref 0.5–7.8)
ERYTHROCYTE [DISTWIDTH] IN BLOOD BY AUTOMATED COUNT: 11.4 % (ref 11.9–14.6)
HCT VFR BLD AUTO: 33.9 % (ref 41.1–50.3)
HGB BLD-MCNC: 11.1 G/DL (ref 13.6–17.2)
HIV 1+2 AB+HIV1 P24 AG SERPL QL IA: NONREACTIVE
HIV 1/2 RESULT COMMENT: NORMAL
IMM GRANULOCYTES # BLD AUTO: 0 K/UL (ref 0–0.5)
IMM GRANULOCYTES NFR BLD AUTO: 0 % (ref 0–5)
LYMPHOCYTES # BLD: 1.9 K/UL (ref 0.5–4.6)
LYMPHOCYTES NFR BLD: 33 % (ref 13–44)
MCH RBC QN AUTO: 31.2 PG (ref 26.1–32.9)
MCHC RBC AUTO-ENTMCNC: 32.7 G/DL (ref 31.4–35)
MCV RBC AUTO: 95.2 FL (ref 79.6–97.8)
MONOCYTES # BLD: 0.3 K/UL (ref 0.1–1.3)
MONOCYTES NFR BLD: 5 % (ref 4–12)
NEUTS SEG # BLD: 3.1 K/UL (ref 1.7–8.2)
NEUTS SEG NFR BLD: 53 % (ref 43–78)
NRBC # BLD: 0 K/UL (ref 0–0.2)
PLATELET # BLD AUTO: 304 K/UL (ref 150–450)
PMV BLD AUTO: 10.3 FL (ref 9.4–12.3)
RBC # BLD AUTO: 3.56 M/UL (ref 4.23–5.6)
WBC # BLD AUTO: 5.9 K/UL (ref 4.3–11.1)

## 2022-09-13 LAB
EST. AVERAGE GLUCOSE BLD GHB EST-MCNC: 171 MG/DL
HBA1C MFR BLD: 7.6 % (ref 4.8–5.6)

## 2022-09-14 LAB
25(OH)D3 SERPL-MCNC: 51 NG/ML (ref 30–100)
ALBUMIN SERPL-MCNC: 3.7 G/DL (ref 3.2–4.6)
ALBUMIN/GLOB SERPL: 1.1 {RATIO} (ref 1.2–3.5)
ALP SERPL-CCNC: 112 U/L (ref 50–136)
ALT SERPL-CCNC: 13 U/L (ref 12–65)
ANION GAP SERPL CALC-SCNC: 6 MMOL/L (ref 4–13)
AST SERPL-CCNC: 7 U/L (ref 15–37)
BILIRUB SERPL-MCNC: 0.4 MG/DL (ref 0.2–1.1)
BUN SERPL-MCNC: 18 MG/DL (ref 8–23)
CALCIUM SERPL-MCNC: 9.2 MG/DL (ref 8.3–10.4)
CHLORIDE SERPL-SCNC: 103 MMOL/L (ref 101–110)
CHOLEST SERPL-MCNC: 234 MG/DL
CO2 SERPL-SCNC: 29 MMOL/L (ref 21–32)
CREAT SERPL-MCNC: 1.2 MG/DL (ref 0.8–1.5)
GLOBULIN SER CALC-MCNC: 3.5 G/DL (ref 2.3–3.5)
GLUCOSE SERPL-MCNC: 192 MG/DL (ref 65–100)
HCV AB SER QL: NONREACTIVE
HDLC SERPL-MCNC: 63 MG/DL (ref 40–60)
HDLC SERPL: 3.7 {RATIO}
LDLC SERPL CALC-MCNC: 157.6 MG/DL
POTASSIUM SERPL-SCNC: 3.5 MMOL/L (ref 3.5–5.1)
PROT SERPL-MCNC: 7.2 G/DL (ref 6.3–8.2)
SODIUM SERPL-SCNC: 138 MMOL/L (ref 138–145)
TRIGL SERPL-MCNC: 67 MG/DL (ref 35–150)
VLDLC SERPL CALC-MCNC: 13.4 MG/DL (ref 6–23)

## 2022-10-03 ENCOUNTER — TELEPHONE (OUTPATIENT)
Dept: PRIMARY CARE CLINIC | Facility: CLINIC | Age: 62
End: 2022-10-03

## 2022-10-03 DIAGNOSIS — E78.2 MIXED HYPERLIPIDEMIA: Primary | ICD-10-CM

## 2022-10-06 RX ORDER — ROSUVASTATIN CALCIUM 5 MG/1
5 TABLET, COATED ORAL NIGHTLY
Qty: 30 TABLET | Refills: 5 | Status: SHIPPED | OUTPATIENT
Start: 2022-10-06

## 2022-10-06 NOTE — RESULT ENCOUNTER NOTE
1. Cholesterol: 234 (was 238); LDL: 157 (was 162) - he is supposed to be taking Atorvastatin 40 mg daily. 2. HbA1c: 7.6 (was 13.5) - excellent. Remainder of his labs are good.

## 2022-10-06 NOTE — TELEPHONE ENCOUNTER
Pt notified, he stopped taking the atorvastatin because it makes him nauseated. Do you want to prescribe something different?

## 2022-10-06 NOTE — TELEPHONE ENCOUNTER
1. Cholesterol: 234 (was 238); LDL: 157 (was 162) - he is supposed to be taking Atorvastatin 40 mg daily. 2. HbA1c: 7.6 (was 13.5) - excellent. Remainder of his labs are good. There is nothing in his lab work that would explain his symptoms. Tell him to make sure he is staying hydrated.

## 2022-11-04 DIAGNOSIS — E11.65 TYPE 2 DIABETES MELLITUS WITH HYPERGLYCEMIA (HCC): ICD-10-CM

## 2022-11-05 RX ORDER — SEMAGLUTIDE 1.34 MG/ML
INJECTION, SOLUTION SUBCUTANEOUS
Qty: 3 ML | Refills: 5 | Status: SHIPPED | OUTPATIENT
Start: 2022-11-05

## 2022-11-05 RX ORDER — CHLORTHALIDONE 50 MG/1
50 TABLET ORAL DAILY
Qty: 90 TABLET | Refills: 2 | Status: SHIPPED | OUTPATIENT
Start: 2022-11-05

## 2022-11-05 RX ORDER — SEMAGLUTIDE 1.34 MG/ML
1 INJECTION, SOLUTION SUBCUTANEOUS
Qty: 3 ADJUSTABLE DOSE PRE-FILLED PEN SYRINGE | Refills: 1 | Status: SHIPPED | OUTPATIENT
Start: 2022-11-05

## 2022-12-29 ENCOUNTER — TELEMEDICINE (OUTPATIENT)
Dept: PRIMARY CARE CLINIC | Facility: CLINIC | Age: 62
End: 2022-12-29
Payer: COMMERCIAL

## 2022-12-29 ENCOUNTER — TELEPHONE (OUTPATIENT)
Dept: PRIMARY CARE CLINIC | Facility: CLINIC | Age: 62
End: 2022-12-29

## 2022-12-29 DIAGNOSIS — I10 ESSENTIAL (PRIMARY) HYPERTENSION: ICD-10-CM

## 2022-12-29 DIAGNOSIS — E78.2 MIXED HYPERLIPIDEMIA: ICD-10-CM

## 2022-12-29 DIAGNOSIS — G62.9 NEUROPATHY: ICD-10-CM

## 2022-12-29 DIAGNOSIS — Z79.899 ENCOUNTER FOR LONG-TERM (CURRENT) USE OF MEDICATIONS: ICD-10-CM

## 2022-12-29 DIAGNOSIS — E11.65 TYPE 2 DIABETES MELLITUS WITH HYPERGLYCEMIA, WITHOUT LONG-TERM CURRENT USE OF INSULIN (HCC): Primary | ICD-10-CM

## 2022-12-29 DIAGNOSIS — E55.9 VITAMIN D DEFICIENCY: ICD-10-CM

## 2022-12-29 DIAGNOSIS — K59.04 CHRONIC IDIOPATHIC CONSTIPATION: ICD-10-CM

## 2022-12-29 DIAGNOSIS — R53.82 CHRONIC FATIGUE: ICD-10-CM

## 2022-12-29 PROCEDURE — 2022F DILAT RTA XM EVC RTNOPTHY: CPT | Performed by: FAMILY MEDICINE

## 2022-12-29 PROCEDURE — G8427 DOCREV CUR MEDS BY ELIG CLIN: HCPCS | Performed by: FAMILY MEDICINE

## 2022-12-29 PROCEDURE — 3017F COLORECTAL CA SCREEN DOC REV: CPT | Performed by: FAMILY MEDICINE

## 2022-12-29 PROCEDURE — 99214 OFFICE O/P EST MOD 30 MIN: CPT | Performed by: FAMILY MEDICINE

## 2022-12-29 RX ORDER — AMLODIPINE BESYLATE 10 MG/1
10 TABLET ORAL DAILY
Qty: 90 TABLET | Refills: 3 | Status: SHIPPED | OUTPATIENT
Start: 2022-12-29

## 2022-12-29 RX ORDER — ROSUVASTATIN CALCIUM 5 MG/1
5 TABLET, COATED ORAL NIGHTLY
Qty: 90 TABLET | Refills: 3 | Status: SHIPPED | OUTPATIENT
Start: 2022-12-29

## 2022-12-29 RX ORDER — SEMAGLUTIDE 1.34 MG/ML
INJECTION, SOLUTION SUBCUTANEOUS
Qty: 12 ML | Refills: 5 | Status: SHIPPED | OUTPATIENT
Start: 2022-12-29

## 2022-12-29 RX ORDER — ERGOCALCIFEROL 1.25 MG/1
50000 CAPSULE ORAL
Qty: 12 CAPSULE | Refills: 3 | Status: SHIPPED | OUTPATIENT
Start: 2022-12-29

## 2022-12-29 RX ORDER — GABAPENTIN 300 MG/1
300 CAPSULE ORAL 3 TIMES DAILY
Qty: 270 CAPSULE | Refills: 3 | Status: SHIPPED | OUTPATIENT
Start: 2022-12-29 | End: 2023-01-28

## 2022-12-29 RX ORDER — EZETIMIBE 10 MG/1
10 TABLET ORAL DAILY
Qty: 90 TABLET | Refills: 3 | Status: SHIPPED | OUTPATIENT
Start: 2022-12-29

## 2022-12-29 NOTE — TELEPHONE ENCOUNTER
Left a message with [facility] about tri-med and how to order the medication.  Left detailed message

## 2022-12-29 NOTE — PROGRESS NOTES
Flavia Melendrez M.D.  [address] [facility]  Phone:  [phone]  Fax:  [phone]          I was in the office while conducting this encounter. The patient was at home. CHIEF COMPLAINT:  Chief Complaint   Patient presents with    Diabetes     His blood sugars have been under control. He has been taking Rybelsus and Ozempic. Hypertension     His blood pressure has been under control. He takes his medication daily as prescribed. Erectile Dysfunction     He has been using a medication called Tri-Med from [facility]. He would like to get a refill. Peripheral Neuropathy     He is complaining of burning in his feet. His insurance would not cover the Lyrica. He has been rubbing his feet with alcohol which helps some. HISTORY OF PRESENT ILLNESS:  Mr. Jamie Tai is a 60 y.o. male  who presents for follow up. His blood sugars have been under control. He has been taking Rybelsus and Ozempic. His blood pressure has been under control. He takes his medication daily as prescribed. Denies chest pain, shortness of breath, headaches or blurred vision. He has been using a medication called Tri-Med from [facility]. He would like to get a refill. He is complaining of burning in his feet. His insurance would not cover the Lyrica. He has been rubbing his feet with alcohol which helps some. No other complaints. Taking medications as prescribed. Medications reviewed and updated. HISTORY:  No Known Allergies      REVIEW OF SYSTEMS:  Review of systems is as indicated in HPI otherwise negative. PHYSICAL EXAM:    Vital Signs: (As obtained by patient/caregiver at home)  No flowsheet data found.         PHQ:  PHQ-9  4/28/2022   Little interest or pleasure in doing things 0   Little interest or pleasure in doing things 0   Feeling down, depressed, or hopeless 0   PHQ-2 Score 0   Total Score PHQ 2 0   PHQ-9 Total Score 0 LABS  No results found for this visit on 12/29/22. Orders Only on 09/12/2022   Component Date Value Ref Range Status    Hepatitis C Ab 09/12/2022 NONREACTIVE  NONREACTIVE   Final    HIV 1/2 Interp 09/12/2022 NONREACTIVE  NONREACTIVE   Final    HIV 1/2 Result Comment 09/12/2022 SEE NOTE    Final    Comment: While this assay is highly sensitive, a non-reactive/negative result for HIV antibodies HIV-1 and HIV-2 and p24 antigen, does not exclude the possibility of exposure to or infection with HIV. HIV antibodies and/or p24 antigen may be undetectable in some stages of the infection and in some clinical conditions. Test performed by the [employer] HIV Ag/Ab Combo (CHIV), 4th generation assay. Recommend consulting relevant CDC guidelines for informing test subject of the result and its interpretation.       Vit D, 25-Hydroxy 09/12/2022 51.0  30.0 - 100.0 ng/mL Final    Hemoglobin A1C 09/12/2022 7.6 (A)  4.8 - 5.6 % Final    eAG 09/12/2022 171  mg/dL Final    Comment: Reference Range  Normal: 4.8-5.6  Diabetic >=6.5%  Normal       <5.7%      Cholesterol, Total 09/12/2022 234 (A)  <200 MG/DL Final    Comment: Borderline High: 200-239 mg/dL  High: Greater than or equal to 240 mg/dL      Triglycerides 09/12/2022 67  35 - 150 MG/DL Final    Comment: Borderline High: 150-199 mg/dL, High: 200-499 mg/dL  Very High: Greater than or equal to 500 mg/dL      HDL 09/12/2022 63 (A)  40 - 60 MG/DL Final    LDL Calculated 09/12/2022 157.6 (A)  <100 MG/DL Final    Comment: Near Optimal: 100-129 mg/dL  Borderline High: 130-159, High: 160-189 mg/dL  Very High: Greater than or equal to 190 mg/dL      VLDL Cholesterol Calculated 09/12/2022 13.4  6.0 - 23.0 MG/DL Final    Chol/HDL Ratio 09/12/2022 3.7    Final    Sodium 09/12/2022 138  138 - 145 mmol/L Final    Potassium 09/12/2022 3.5  3.5 - 5.1 mmol/L Final    Chloride 09/12/2022 103  101 - 110 mmol/L Final    CO2 09/12/2022 29  21 - 32 mmol/L Final    Anion Gap 09/12/2022 6  4 - 13 mmol/L Final    Glucose 09/12/2022 192 (A)  65 - 100 mg/dL Final    BUN 09/12/2022 18  8 - 23 MG/DL Final    Creatinine 09/12/2022 1.20  0.8 - 1.5 MG/DL Final    GFR  09/12/2022 >60  >60 ml/min/1.73m2 Final    GFR Non- 09/12/2022 >60  >60 ml/min/1.73m2 Final    Comment:   Estimated GFR is calculated using the Modification of Diet in Renal Disease (MDRD) Study equation, reported for both  Americans (GFRAA) and non- Americans (GFRNA), and normalized to 1.73m2 body surface area. The physician must decide which value applies to the patient. The MDRD study equation should only be used in individuals age 25 or older. It has not been validated for the following: pregnant women, patients with serious comorbid conditions,or on certain medications, or persons with extremes of body size, muscle mass, or nutritional status.       Calcium 09/12/2022 9.2  8.3 - 10.4 MG/DL Final    Total Bilirubin 09/12/2022 0.4  0.2 - 1.1 MG/DL Final    ALT 09/12/2022 13  12 - 65 U/L Final    AST 09/12/2022 7 (A)  15 - 37 U/L Final    Alk Phosphatase 09/12/2022 112  50 - 136 U/L Final    Total Protein 09/12/2022 7.2  6.3 - 8.2 g/dL Final    Albumin 09/12/2022 3.7  3.2 - 4.6 g/dL Final    Globulin 09/12/2022 3.5  2.3 - 3.5 g/dL Final    Albumin/Globulin Ratio 09/12/2022 1.1 (A)  1.2 - 3.5   Final    WBC 09/12/2022 5.9  4.3 - 11.1 K/uL Final    RBC 09/12/2022 3.56 (A)  4.23 - 5.6 M/uL Final    Hemoglobin 09/12/2022 11.1 (A)  13.6 - 17.2 g/dL Final    Hematocrit 09/12/2022 33.9 (A)  41.1 - 50.3 % Final    MCV 09/12/2022 95.2  79.6 - 97.8 FL Final    MCH 09/12/2022 31.2  26.1 - 32.9 PG Final    MCHC 09/12/2022 32.7  31.4 - 35.0 g/dL Final    RDW 09/12/2022 11.4 (A)  11.9 - 14.6 % Final    Platelets 93/62/7186 304  150 - 450 K/uL Final    MPV 09/12/2022 10.3  9.4 - 12.3 FL Final    nRBC 09/12/2022 0.00  0.0 - 0.2 K/uL Final    **Note: Absolute NRBC parameter is now reported with Hemogram** Differential Type 09/12/2022 AUTOMATED    Final    Seg Neutrophils 09/12/2022 53  43 - 78 % Final    Lymphocytes 09/12/2022 33  13 - 44 % Final    Monocytes 09/12/2022 5  4.0 - 12.0 % Final    Eosinophils % 09/12/2022 8 (A)  0.5 - 7.8 % Final    Basophils 09/12/2022 1  0.0 - 2.0 % Final    Immature Granulocytes 09/12/2022 0  0.0 - 5.0 % Final    Segs Absolute 09/12/2022 3.1  1.7 - 8.2 K/UL Final    Absolute Lymph # 09/12/2022 1.9  0.5 - 4.6 K/UL Final    Absolute Mono # 09/12/2022 0.3  0.1 - 1.3 K/UL Final    Absolute Eos # 09/12/2022 0.5  0.0 - 0.8 K/UL Final    Basophils Absolute 09/12/2022 0.1  0.0 - 0.2 K/UL Final    Absolute Immature Granulocyte 09/12/2022 0.0  0.0 - 0.5 K/UL Final       IMPRESSION/PLAN     Diagnosis Orders   1. Type 2 diabetes mellitus with hyperglycemia, without long-term current use of insulin (HCC)  Semaglutide, 1 MG/DOSE, (OZEMPIC, 1 MG/DOSE,) 4 MG/3ML SOPN    CBC with Auto Differential    Comprehensive Metabolic Panel    Hemoglobin A1C      2. Essential (primary) hypertension  amLODIPine (NORVASC) 10 MG tablet    CBC with Auto Differential    Comprehensive Metabolic Panel      3. Neuropathy  gabapentin (NEURONTIN) 300 MG capsule      4. Mixed hyperlipidemia  ezetimibe (ZETIA) 10 MG tablet    rosuvastatin (CRESTOR) 5 MG tablet    Lipid Panel      5. Chronic fatigue  Iron    Ferritin      6. Chronic idiopathic constipation  linaclotide (LINZESS) 145 MCG capsule      7. Vitamin D deficiency  ergocalciferol (ERGOCALCIFEROL) 1.25 MG (28207 UT) capsule    Vitamin D 25 Hydroxy      8. Encounter for long-term (current) use of medications  CBC with Auto Differential    Comprehensive Metabolic Panel          Follow up and Dispositions:  Return in about 4 months (around 4/29/2023) for [address] [place] [address]. Patient is stable on medications and will continue current medications. Refilled the above medications. Reviewed medications and side effects in detail. Will check the above labs.   Reviewed most recent labs. Reviewed diet, exercise and weight control. Cardiovascular risks and recommendations reviewed. Patient encouraged to follow a diabetic/low sodium diet. Patient will check blood sugars once daily. Consent: This patient and/or their healthcare decision maker is aware that this patient-initiated Telehealth encounter is a billable service, with coverage as determined by their insurance carrier. Patient is aware that they may receive a bill and has provided verbal consent to proceed: Yes     The patient is being evaluated by a Virtual Visit (video visit) encounter to address concerns as mentioned above. A caregiver was present when appropriate. Due to this being a TeleHealth encounter (During JXDAI-97 public health emergency), evaluation of the following organ systems was limited: Vitals/Constitutional/EENT/Resp/CV/GI//MS/Neuro/Skin/Heme-Lymph-Imm. Pursuant to the emergency declaration under the [address] [place] [address], [address] [place] [address] authority and the [employer] and [employer] Act, this Virtual Visit was conducted with patient's (and/or legal guardian's) consent, to reduce the patient's risk of exposure to COVID-19 and provide necessary medical care. The patient (and/or legal guardian) has also been advised to contact this office for worsening conditions or problems, and seek emergency medical treatment and/or call 911 if deemed necessary. Patient identification was verified at the start of the visit: YES    Services were provided through a video synchronous discussion virtually to substitute for in-person clinic visit. Patient and provider were located at their individual homes. Retia Monday, was evaluated through a synchronous (real-time) audio-video encounter. The patient (or guardian if applicable) is aware that this is a billable service, which includes applicable co-pays.  This Virtual Visit was conducted with patient's (and/or legal guardian's) consent. The visit was conducted pursuant to the emergency declaration under the [facility] [address] and the [employer] and [employer] Act. Patient identification was verified, and a caregiver was present when appropriate. The patient was located at Home: [address] [address]. Provider was located at [facility] ([address] [place] [address]): [address].. Total Time: minutes: 11-20 minutes. Dictated using voice recognition software.  Proofread, but unrecognized voice recognition errors may exist.    Inna Hancock MD  12/29/22

## 2023-04-24 DIAGNOSIS — E78.2 MIXED HYPERLIPIDEMIA: ICD-10-CM

## 2023-04-24 DIAGNOSIS — E11.65 TYPE 2 DIABETES MELLITUS WITH HYPERGLYCEMIA, WITHOUT LONG-TERM CURRENT USE OF INSULIN (HCC): ICD-10-CM

## 2023-04-24 DIAGNOSIS — I10 ESSENTIAL (PRIMARY) HYPERTENSION: ICD-10-CM

## 2023-04-24 DIAGNOSIS — R53.82 CHRONIC FATIGUE: ICD-10-CM

## 2023-04-24 DIAGNOSIS — Z79.899 ENCOUNTER FOR LONG-TERM (CURRENT) USE OF MEDICATIONS: ICD-10-CM

## 2023-04-24 DIAGNOSIS — E55.9 VITAMIN D DEFICIENCY: ICD-10-CM

## 2023-04-24 LAB
25(OH)D3 SERPL-MCNC: 107.3 NG/ML (ref 30–100)
ALBUMIN SERPL-MCNC: 3.8 G/DL (ref 3.2–4.6)
ALBUMIN/GLOB SERPL: 1.1 (ref 0.4–1.6)
ALP SERPL-CCNC: 108 U/L (ref 50–136)
ALT SERPL-CCNC: 28 U/L (ref 12–65)
ANION GAP SERPL CALC-SCNC: 3 MMOL/L (ref 2–11)
AST SERPL-CCNC: 15 U/L (ref 15–37)
BASOPHILS # BLD: 0.1 K/UL (ref 0–0.2)
BASOPHILS NFR BLD: 1 % (ref 0–2)
BILIRUB SERPL-MCNC: 0.4 MG/DL (ref 0.2–1.1)
BUN SERPL-MCNC: 19 MG/DL (ref 8–23)
CALCIUM SERPL-MCNC: 9.6 MG/DL (ref 8.3–10.4)
CHLORIDE SERPL-SCNC: 104 MMOL/L (ref 101–110)
CHOLEST SERPL-MCNC: 178 MG/DL
CO2 SERPL-SCNC: 29 MMOL/L (ref 21–32)
CREAT SERPL-MCNC: 1.1 MG/DL (ref 0.8–1.5)
DIFFERENTIAL METHOD BLD: ABNORMAL
EOSINOPHIL # BLD: 0.5 K/UL (ref 0–0.8)
EOSINOPHIL NFR BLD: 8 % (ref 0.5–7.8)
ERYTHROCYTE [DISTWIDTH] IN BLOOD BY AUTOMATED COUNT: 11.6 % (ref 11.9–14.6)
FERRITIN SERPL-MCNC: 152 NG/ML (ref 8–388)
GLOBULIN SER CALC-MCNC: 3.6 G/DL (ref 2.8–4.5)
GLUCOSE SERPL-MCNC: 197 MG/DL (ref 65–100)
HCT VFR BLD AUTO: 35.2 % (ref 41.1–50.3)
HDLC SERPL-MCNC: 73 MG/DL (ref 40–60)
HDLC SERPL: 2.4
HGB BLD-MCNC: 11.7 G/DL (ref 13.6–17.2)
IMM GRANULOCYTES # BLD AUTO: 0 K/UL (ref 0–0.5)
IMM GRANULOCYTES NFR BLD AUTO: 0 % (ref 0–5)
IRON SERPL-MCNC: 77 UG/DL (ref 35–150)
LDLC SERPL CALC-MCNC: 92.6 MG/DL
LYMPHOCYTES # BLD: 1.9 K/UL (ref 0.5–4.6)
LYMPHOCYTES NFR BLD: 30 % (ref 13–44)
MCH RBC QN AUTO: 32.4 PG (ref 26.1–32.9)
MCHC RBC AUTO-ENTMCNC: 33.2 G/DL (ref 31.4–35)
MCV RBC AUTO: 97.5 FL (ref 82–102)
MONOCYTES # BLD: 0.4 K/UL (ref 0.1–1.3)
MONOCYTES NFR BLD: 6 % (ref 4–12)
NEUTS SEG # BLD: 3.5 K/UL (ref 1.7–8.2)
NEUTS SEG NFR BLD: 55 % (ref 43–78)
NRBC # BLD: 0 K/UL (ref 0–0.2)
PLATELET # BLD AUTO: 301 K/UL (ref 150–450)
PMV BLD AUTO: 9.9 FL (ref 9.4–12.3)
POTASSIUM SERPL-SCNC: 3.9 MMOL/L (ref 3.5–5.1)
PROT SERPL-MCNC: 7.4 G/DL (ref 6.3–8.2)
RBC # BLD AUTO: 3.61 M/UL (ref 4.23–5.6)
SODIUM SERPL-SCNC: 136 MMOL/L (ref 133–143)
TRIGL SERPL-MCNC: 62 MG/DL (ref 35–150)
VLDLC SERPL CALC-MCNC: 12.4 MG/DL (ref 6–23)
WBC # BLD AUTO: 6.4 K/UL (ref 4.3–11.1)

## 2023-04-24 NOTE — TELEPHONE ENCOUNTER
Patient would like just one prescription with no refills for Rybelsus sent to [facility] on [address]. He regular medication his regular pharmacy is out.

## 2023-04-26 LAB
EST. AVERAGE GLUCOSE BLD GHB EST-MCNC: 151 MG/DL
HBA1C MFR BLD: 6.9 % (ref 4.8–5.6)

## 2023-05-01 ENCOUNTER — TELEMEDICINE (OUTPATIENT)
Dept: PRIMARY CARE CLINIC | Facility: CLINIC | Age: 63
End: 2023-05-01
Payer: COMMERCIAL

## 2023-05-01 DIAGNOSIS — E55.9 VITAMIN D DEFICIENCY: ICD-10-CM

## 2023-05-01 DIAGNOSIS — E11.65 TYPE 2 DIABETES MELLITUS WITH HYPERGLYCEMIA, WITHOUT LONG-TERM CURRENT USE OF INSULIN (HCC): Primary | ICD-10-CM

## 2023-05-01 DIAGNOSIS — E78.2 MIXED HYPERLIPIDEMIA: ICD-10-CM

## 2023-05-01 DIAGNOSIS — G89.29 CHRONIC RIGHT HIP PAIN: ICD-10-CM

## 2023-05-01 DIAGNOSIS — I10 ESSENTIAL (PRIMARY) HYPERTENSION: ICD-10-CM

## 2023-05-01 DIAGNOSIS — Z79.899 ENCOUNTER FOR LONG-TERM (CURRENT) USE OF MEDICATIONS: ICD-10-CM

## 2023-05-01 DIAGNOSIS — M25.551 CHRONIC RIGHT HIP PAIN: ICD-10-CM

## 2023-05-01 PROCEDURE — 99214 OFFICE O/P EST MOD 30 MIN: CPT | Performed by: FAMILY MEDICINE

## 2023-05-01 PROCEDURE — 2022F DILAT RTA XM EVC RTNOPTHY: CPT | Performed by: FAMILY MEDICINE

## 2023-05-01 PROCEDURE — G8427 DOCREV CUR MEDS BY ELIG CLIN: HCPCS | Performed by: FAMILY MEDICINE

## 2023-05-01 PROCEDURE — 3044F HG A1C LEVEL LT 7.0%: CPT | Performed by: FAMILY MEDICINE

## 2023-05-01 PROCEDURE — 3017F COLORECTAL CA SCREEN DOC REV: CPT | Performed by: FAMILY MEDICINE

## 2023-05-01 RX ORDER — CHLORTHALIDONE 50 MG/1
50 TABLET ORAL DAILY
Qty: 90 TABLET | Refills: 3 | Status: SHIPPED | OUTPATIENT
Start: 2023-05-01

## 2023-05-01 RX ORDER — METHYLPREDNISOLONE 4 MG/1
TABLET ORAL
Qty: 1 KIT | Refills: 0 | Status: SHIPPED | OUTPATIENT
Start: 2023-05-01

## 2023-05-01 RX ORDER — ERGOCALCIFEROL 1.25 MG/1
50000 CAPSULE ORAL
Qty: 12 CAPSULE | Refills: 3 | Status: SHIPPED
Start: 2023-05-01

## 2023-05-01 RX ORDER — AMLODIPINE BESYLATE 10 MG/1
10 TABLET ORAL DAILY
Qty: 90 TABLET | Refills: 3 | Status: SHIPPED | OUTPATIENT
Start: 2023-05-01

## 2023-05-01 RX ORDER — SEMAGLUTIDE 2.68 MG/ML
2 INJECTION, SOLUTION SUBCUTANEOUS
Qty: 9 ML | Refills: 5 | Status: SHIPPED | OUTPATIENT
Start: 2023-05-31

## 2023-05-01 SDOH — ECONOMIC STABILITY: TRANSPORTATION INSECURITY
IN THE PAST 12 MONTHS, HAS LACK OF TRANSPORTATION KEPT YOU FROM MEETINGS, WORK, OR FROM GETTING THINGS NEEDED FOR DAILY LIVING?: NO

## 2023-05-01 SDOH — ECONOMIC STABILITY: HOUSING INSECURITY
IN THE LAST 12 MONTHS, WAS THERE A TIME WHEN YOU DID NOT HAVE A STEADY PLACE TO SLEEP OR SLEPT IN A SHELTER (INCLUDING NOW)?: NO

## 2023-05-01 SDOH — ECONOMIC STABILITY: FOOD INSECURITY: WITHIN THE PAST 12 MONTHS, THE FOOD YOU BOUGHT JUST DIDN'T LAST AND YOU DIDN'T HAVE MONEY TO GET MORE.: OFTEN TRUE

## 2023-05-01 SDOH — ECONOMIC STABILITY: FOOD INSECURITY: WITHIN THE PAST 12 MONTHS, YOU WORRIED THAT YOUR FOOD WOULD RUN OUT BEFORE YOU GOT MONEY TO BUY MORE.: NEVER TRUE

## 2023-05-01 SDOH — ECONOMIC STABILITY: INCOME INSECURITY: HOW HARD IS IT FOR YOU TO PAY FOR THE VERY BASICS LIKE FOOD, HOUSING, MEDICAL CARE, AND HEATING?: NOT VERY HARD

## 2023-05-01 ASSESSMENT — PATIENT HEALTH QUESTIONNAIRE - PHQ9
SUM OF ALL RESPONSES TO PHQ QUESTIONS 1-9: 0
1. LITTLE INTEREST OR PLEASURE IN DOING THINGS: 0
SUM OF ALL RESPONSES TO PHQ9 QUESTIONS 1 & 2: 0
SUM OF ALL RESPONSES TO PHQ QUESTIONS 1-9: 0
SUM OF ALL RESPONSES TO PHQ QUESTIONS 1-9: 0
2. FEELING DOWN, DEPRESSED OR HOPELESS: 0
SUM OF ALL RESPONSES TO PHQ QUESTIONS 1-9: 0

## 2023-05-01 NOTE — PROGRESS NOTES
weekly. Reviewed medications and side effects in detail. A Medrol   Will check the above labs. Reviewed most recent labs. Reviewed diet, exercise and weight control. Cardiovascular risks and recommendations reviewed. Patient encouraged to follow a diabetic/low sodium diet. Patient will check blood sugars once daily. Consent: This patient and/or their healthcare decision maker is aware that this patient-initiated Telehealth encounter is a billable service, with coverage as determined by their insurance carrier. Patient is aware that they may receive a bill and has provided verbal consent to proceed: Yes     The patient is being evaluated by a Virtual Visit (video visit) encounter to address concerns as mentioned above. A caregiver was present when appropriate. Due to this being a TeleHealth encounter (During [facility] public health emergency), evaluation of the following organ systems was limited: Vitals/Constitutional/EENT/Resp/CV/GI//MS/Neuro/Skin/Heme-Lymph-Imm. Pursuant to the emergency declaration under the [address] [place] [address] authority and the [employer] and [employer] Act, this Virtual Visit was conducted with patient's (and/or legal guardian's) consent, to reduce the patient's risk of exposure to COVID-19 and provide necessary medical care. The patient (and/or legal guardian) has also been advised to contact this office for worsening conditions or problems, and seek emergency medical treatment and/or call 911 if deemed necessary. Patient identification was verified at the start of the visit: YES    Services were provided through a video synchronous discussion virtually to substitute for in-person clinic visit. Patient and provider were located at their individual homes. Herman Chapin, was evaluated through a synchronous (real-time) audio-video encounter.  The patient (or guardian if applicable) is aware

## 2023-08-20 RX ORDER — ORAL SEMAGLUTIDE 7 MG/1
TABLET ORAL
Qty: 30 TABLET | Refills: 5 | Status: SHIPPED | OUTPATIENT
Start: 2023-08-20

## 2023-08-28 ENCOUNTER — NURSE ONLY (OUTPATIENT)
Dept: PRIMARY CARE CLINIC | Facility: CLINIC | Age: 63
End: 2023-08-28

## 2023-08-28 DIAGNOSIS — E78.2 MIXED HYPERLIPIDEMIA: ICD-10-CM

## 2023-08-28 DIAGNOSIS — E11.65 TYPE 2 DIABETES MELLITUS WITH HYPERGLYCEMIA, WITHOUT LONG-TERM CURRENT USE OF INSULIN (HCC): Primary | ICD-10-CM

## 2023-08-28 DIAGNOSIS — E11.65 TYPE 2 DIABETES MELLITUS WITH HYPERGLYCEMIA, WITHOUT LONG-TERM CURRENT USE OF INSULIN (HCC): ICD-10-CM

## 2023-08-28 DIAGNOSIS — Z79.899 ENCOUNTER FOR LONG-TERM (CURRENT) USE OF MEDICATIONS: ICD-10-CM

## 2023-08-28 DIAGNOSIS — I10 ESSENTIAL (PRIMARY) HYPERTENSION: ICD-10-CM

## 2023-08-28 DIAGNOSIS — E55.9 VITAMIN D DEFICIENCY: ICD-10-CM

## 2023-08-28 DIAGNOSIS — R53.82 CHRONIC FATIGUE: ICD-10-CM

## 2023-08-28 LAB
25(OH)D3 SERPL-MCNC: 77.1 NG/ML (ref 30–100)
ALBUMIN SERPL-MCNC: 3.5 G/DL (ref 3.2–4.6)
ALBUMIN/GLOB SERPL: 0.9 (ref 0.4–1.6)
ALP SERPL-CCNC: 92 U/L (ref 50–136)
ALT SERPL-CCNC: 18 U/L (ref 12–65)
ANION GAP SERPL CALC-SCNC: 4 MMOL/L (ref 2–11)
AST SERPL-CCNC: 11 U/L (ref 15–37)
BASOPHILS # BLD: 0.1 K/UL (ref 0–0.2)
BASOPHILS NFR BLD: 1 % (ref 0–2)
BILIRUB SERPL-MCNC: 0.4 MG/DL (ref 0.2–1.1)
BUN SERPL-MCNC: 22 MG/DL (ref 8–23)
CALCIUM SERPL-MCNC: 9.1 MG/DL (ref 8.3–10.4)
CHLORIDE SERPL-SCNC: 103 MMOL/L (ref 101–110)
CHOLEST SERPL-MCNC: 141 MG/DL
CO2 SERPL-SCNC: 31 MMOL/L (ref 21–32)
CREAT SERPL-MCNC: 1.2 MG/DL (ref 0.8–1.5)
DIFFERENTIAL METHOD BLD: ABNORMAL
EOSINOPHIL # BLD: 0.5 K/UL (ref 0–0.8)
EOSINOPHIL NFR BLD: 8 % (ref 0.5–7.8)
ERYTHROCYTE [DISTWIDTH] IN BLOOD BY AUTOMATED COUNT: 11.1 % (ref 11.9–14.6)
GLOBULIN SER CALC-MCNC: 3.8 G/DL (ref 2.8–4.5)
GLUCOSE SERPL-MCNC: 197 MG/DL (ref 65–100)
HCT VFR BLD AUTO: 33.9 % (ref 41.1–50.3)
HDLC SERPL-MCNC: 60 MG/DL (ref 40–60)
HDLC SERPL: 2.4
HGB BLD-MCNC: 11.1 G/DL (ref 13.6–17.2)
IMM GRANULOCYTES # BLD AUTO: 0 K/UL (ref 0–0.5)
IMM GRANULOCYTES NFR BLD AUTO: 0 % (ref 0–5)
LDLC SERPL CALC-MCNC: 68.8 MG/DL
LYMPHOCYTES # BLD: 1.8 K/UL (ref 0.5–4.6)
LYMPHOCYTES NFR BLD: 27 % (ref 13–44)
MCH RBC QN AUTO: 31.9 PG (ref 26.1–32.9)
MCHC RBC AUTO-ENTMCNC: 32.7 G/DL (ref 31.4–35)
MCV RBC AUTO: 97.4 FL (ref 82–102)
MONOCYTES # BLD: 0.4 K/UL (ref 0.1–1.3)
MONOCYTES NFR BLD: 7 % (ref 4–12)
NEUTS SEG # BLD: 3.7 K/UL (ref 1.7–8.2)
NEUTS SEG NFR BLD: 57 % (ref 43–78)
NRBC # BLD: 0 K/UL (ref 0–0.2)
PLATELET # BLD AUTO: 269 K/UL (ref 150–450)
PMV BLD AUTO: 10.1 FL (ref 9.4–12.3)
POTASSIUM SERPL-SCNC: 4.2 MMOL/L (ref 3.5–5.1)
PROT SERPL-MCNC: 7.3 G/DL (ref 6.3–8.2)
RBC # BLD AUTO: 3.48 M/UL (ref 4.23–5.6)
SODIUM SERPL-SCNC: 138 MMOL/L (ref 133–143)
TRIGL SERPL-MCNC: 61 MG/DL (ref 35–150)
VLDLC SERPL CALC-MCNC: 12.2 MG/DL (ref 6–23)
WBC # BLD AUTO: 6.5 K/UL (ref 4.3–11.1)

## 2023-08-29 LAB
EST. AVERAGE GLUCOSE BLD GHB EST-MCNC: 148 MG/DL
HBA1C MFR BLD: 6.8 % (ref 4.8–5.6)

## 2023-09-11 ENCOUNTER — TELEMEDICINE (OUTPATIENT)
Dept: PRIMARY CARE CLINIC | Facility: CLINIC | Age: 63
End: 2023-09-11
Payer: COMMERCIAL

## 2023-09-11 DIAGNOSIS — E11.65 TYPE 2 DIABETES MELLITUS WITH HYPERGLYCEMIA, WITHOUT LONG-TERM CURRENT USE OF INSULIN (HCC): Primary | ICD-10-CM

## 2023-09-11 DIAGNOSIS — E78.2 MIXED HYPERLIPIDEMIA: ICD-10-CM

## 2023-09-11 DIAGNOSIS — I10 ESSENTIAL (PRIMARY) HYPERTENSION: ICD-10-CM

## 2023-09-11 DIAGNOSIS — Z79.899 ENCOUNTER FOR LONG-TERM (CURRENT) USE OF MEDICATIONS: ICD-10-CM

## 2023-09-11 PROCEDURE — G8427 DOCREV CUR MEDS BY ELIG CLIN: HCPCS | Performed by: FAMILY MEDICINE

## 2023-09-11 PROCEDURE — 3017F COLORECTAL CA SCREEN DOC REV: CPT | Performed by: FAMILY MEDICINE

## 2023-09-11 PROCEDURE — 3044F HG A1C LEVEL LT 7.0%: CPT | Performed by: FAMILY MEDICINE

## 2023-09-11 PROCEDURE — 99213 OFFICE O/P EST LOW 20 MIN: CPT | Performed by: FAMILY MEDICINE

## 2023-09-11 PROCEDURE — 2022F DILAT RTA XM EVC RTNOPTHY: CPT | Performed by: FAMILY MEDICINE

## 2023-09-11 RX ORDER — ORAL SEMAGLUTIDE 7 MG/1
1 TABLET ORAL DAILY
Qty: 90 TABLET | Refills: 3 | Status: SHIPPED | OUTPATIENT
Start: 2023-09-11

## 2023-09-11 RX ORDER — GLUCOSAMINE HCL/CHONDROITIN SU 500-400 MG
CAPSULE ORAL
Qty: 100 STRIP | Refills: 5 | Status: SHIPPED | OUTPATIENT
Start: 2023-09-11

## 2023-09-11 RX ORDER — LANCETS 30 GAUGE
EACH MISCELLANEOUS
Qty: 100 EACH | Refills: 5 | Status: SHIPPED | OUTPATIENT
Start: 2023-09-11

## 2023-09-11 RX ORDER — SEMAGLUTIDE 2.68 MG/ML
2 INJECTION, SOLUTION SUBCUTANEOUS
Qty: 9 ML | Refills: 5 | Status: SHIPPED | OUTPATIENT
Start: 2023-09-11

## 2023-09-12 NOTE — TELEPHONE ENCOUNTER
Medication for tri mix called into pharmacy with 11 refills per Dr Qing Lindsay order. Called to pharmacist Vik Toney at [facility].

## 2024-01-17 DIAGNOSIS — E78.2 MIXED HYPERLIPIDEMIA: ICD-10-CM

## 2024-01-17 RX ORDER — EZETIMIBE 10 MG/1
10 TABLET ORAL DAILY
Qty: 90 TABLET | Refills: 3 | Status: SHIPPED | OUTPATIENT
Start: 2024-01-17

## 2024-01-22 ENCOUNTER — NURSE ONLY (OUTPATIENT)
Dept: PRIMARY CARE CLINIC | Facility: CLINIC | Age: 64
End: 2024-01-22

## 2024-01-22 DIAGNOSIS — R53.82 CHRONIC FATIGUE: ICD-10-CM

## 2024-01-22 DIAGNOSIS — E03.8 OTHER SPECIFIED HYPOTHYROIDISM: Primary | ICD-10-CM

## 2024-01-22 DIAGNOSIS — E03.8 OTHER SPECIFIED HYPOTHYROIDISM: ICD-10-CM

## 2024-01-22 LAB — TSH, 3RD GENERATION: 1.53 UIU/ML (ref 0.36–3.74)

## 2024-01-29 ENCOUNTER — OFFICE VISIT (OUTPATIENT)
Dept: PRIMARY CARE CLINIC | Facility: CLINIC | Age: 64
End: 2024-01-29
Payer: COMMERCIAL

## 2024-01-29 VITALS
OXYGEN SATURATION: 99 % | HEIGHT: 75 IN | WEIGHT: 183.9 LBS | DIASTOLIC BLOOD PRESSURE: 82 MMHG | SYSTOLIC BLOOD PRESSURE: 138 MMHG | BODY MASS INDEX: 22.87 KG/M2 | HEART RATE: 85 BPM | TEMPERATURE: 97.5 F

## 2024-01-29 DIAGNOSIS — E11.65 TYPE 2 DIABETES MELLITUS WITH HYPERGLYCEMIA, WITHOUT LONG-TERM CURRENT USE OF INSULIN (HCC): Primary | ICD-10-CM

## 2024-01-29 DIAGNOSIS — M79.641 BILATERAL HAND PAIN: ICD-10-CM

## 2024-01-29 DIAGNOSIS — E78.2 MIXED HYPERLIPIDEMIA: ICD-10-CM

## 2024-01-29 DIAGNOSIS — Z80.42 FAMILY HISTORY OF PROSTATE CANCER: ICD-10-CM

## 2024-01-29 DIAGNOSIS — I10 ESSENTIAL (PRIMARY) HYPERTENSION: ICD-10-CM

## 2024-01-29 DIAGNOSIS — Z12.5 SCREENING FOR PROSTATE CANCER: ICD-10-CM

## 2024-01-29 DIAGNOSIS — M79.642 BILATERAL HAND PAIN: ICD-10-CM

## 2024-01-29 DIAGNOSIS — E55.9 VITAMIN D DEFICIENCY: ICD-10-CM

## 2024-01-29 DIAGNOSIS — Z79.899 ENCOUNTER FOR LONG-TERM (CURRENT) USE OF MEDICATIONS: ICD-10-CM

## 2024-01-29 DIAGNOSIS — R20.0 NUMBNESS OF RIGHT HAND: ICD-10-CM

## 2024-01-29 LAB
25(OH)D3 SERPL-MCNC: 40.8 NG/ML (ref 30–100)
ALBUMIN SERPL-MCNC: 3.6 G/DL (ref 3.2–4.6)
ALBUMIN/GLOB SERPL: 0.9 (ref 0.4–1.6)
ALP SERPL-CCNC: 103 U/L (ref 50–136)
ALT SERPL-CCNC: 27 U/L (ref 12–65)
ANION GAP SERPL CALC-SCNC: 3 MMOL/L (ref 2–11)
AST SERPL-CCNC: 20 U/L (ref 15–37)
BASOPHILS # BLD: 0.1 K/UL (ref 0–0.2)
BASOPHILS NFR BLD: 1 % (ref 0–2)
BILIRUB SERPL-MCNC: 0.5 MG/DL (ref 0.2–1.1)
BUN SERPL-MCNC: 19 MG/DL (ref 8–23)
CALCIUM SERPL-MCNC: 10.1 MG/DL (ref 8.3–10.4)
CHLORIDE SERPL-SCNC: 102 MMOL/L (ref 103–113)
CHOLEST SERPL-MCNC: 218 MG/DL
CO2 SERPL-SCNC: 30 MMOL/L (ref 21–32)
CREAT SERPL-MCNC: 1.4 MG/DL (ref 0.8–1.5)
DIFFERENTIAL METHOD BLD: ABNORMAL
EOSINOPHIL # BLD: 0.4 K/UL (ref 0–0.8)
EOSINOPHIL NFR BLD: 7 % (ref 0.5–7.8)
ERYTHROCYTE [DISTWIDTH] IN BLOOD BY AUTOMATED COUNT: 11.7 % (ref 11.9–14.6)
GLOBULIN SER CALC-MCNC: 3.9 G/DL (ref 2.8–4.5)
GLUCOSE SERPL-MCNC: 328 MG/DL (ref 65–100)
HCT VFR BLD AUTO: 33.6 % (ref 41.1–50.3)
HDLC SERPL-MCNC: 56 MG/DL (ref 40–60)
HDLC SERPL: 3.9
HGB BLD-MCNC: 10.7 G/DL (ref 13.6–17.2)
IMM GRANULOCYTES # BLD AUTO: 0 K/UL (ref 0–0.5)
IMM GRANULOCYTES NFR BLD AUTO: 0 % (ref 0–5)
LDLC SERPL CALC-MCNC: 142.4 MG/DL
LYMPHOCYTES # BLD: 1.6 K/UL (ref 0.5–4.6)
LYMPHOCYTES NFR BLD: 25 % (ref 13–44)
MCH RBC QN AUTO: 31.1 PG (ref 26.1–32.9)
MCHC RBC AUTO-ENTMCNC: 31.8 G/DL (ref 31.4–35)
MCV RBC AUTO: 97.7 FL (ref 82–102)
MONOCYTES # BLD: 0.5 K/UL (ref 0.1–1.3)
MONOCYTES NFR BLD: 9 % (ref 4–12)
NEUTS SEG # BLD: 3.7 K/UL (ref 1.7–8.2)
NEUTS SEG NFR BLD: 58 % (ref 43–78)
NRBC # BLD: 0 K/UL (ref 0–0.2)
PLATELET # BLD AUTO: 265 K/UL (ref 150–450)
PMV BLD AUTO: 10.5 FL (ref 9.4–12.3)
POTASSIUM SERPL-SCNC: 3.6 MMOL/L (ref 3.5–5.1)
PROT SERPL-MCNC: 7.5 G/DL (ref 6.3–8.2)
PSA SERPL-MCNC: 0.8 NG/ML
RBC # BLD AUTO: 3.44 M/UL (ref 4.23–5.6)
SODIUM SERPL-SCNC: 135 MMOL/L (ref 136–146)
TRIGL SERPL-MCNC: 98 MG/DL (ref 35–150)
VLDLC SERPL CALC-MCNC: 19.6 MG/DL (ref 6–23)
WBC # BLD AUTO: 6.3 K/UL (ref 4.3–11.1)

## 2024-01-29 PROCEDURE — 3017F COLORECTAL CA SCREEN DOC REV: CPT | Performed by: FAMILY MEDICINE

## 2024-01-29 PROCEDURE — 99214 OFFICE O/P EST MOD 30 MIN: CPT | Performed by: FAMILY MEDICINE

## 2024-01-29 PROCEDURE — G8427 DOCREV CUR MEDS BY ELIG CLIN: HCPCS | Performed by: FAMILY MEDICINE

## 2024-01-29 PROCEDURE — G8484 FLU IMMUNIZE NO ADMIN: HCPCS | Performed by: FAMILY MEDICINE

## 2024-01-29 PROCEDURE — 3075F SYST BP GE 130 - 139MM HG: CPT | Performed by: FAMILY MEDICINE

## 2024-01-29 PROCEDURE — 3046F HEMOGLOBIN A1C LEVEL >9.0%: CPT | Performed by: FAMILY MEDICINE

## 2024-01-29 PROCEDURE — 1036F TOBACCO NON-USER: CPT | Performed by: FAMILY MEDICINE

## 2024-01-29 PROCEDURE — G8420 CALC BMI NORM PARAMETERS: HCPCS | Performed by: FAMILY MEDICINE

## 2024-01-29 PROCEDURE — 2022F DILAT RTA XM EVC RTNOPTHY: CPT | Performed by: FAMILY MEDICINE

## 2024-01-29 PROCEDURE — 3079F DIAST BP 80-89 MM HG: CPT | Performed by: FAMILY MEDICINE

## 2024-01-29 RX ORDER — ERGOCALCIFEROL 1.25 MG/1
50000 CAPSULE ORAL
Qty: 6 CAPSULE | Refills: 3 | Status: SHIPPED | OUTPATIENT
Start: 2024-01-29

## 2024-01-29 RX ORDER — SEMAGLUTIDE 2.68 MG/ML
2 INJECTION, SOLUTION SUBCUTANEOUS
Qty: 9 ML | Refills: 5 | Status: SHIPPED | OUTPATIENT
Start: 2024-01-29

## 2024-01-29 SDOH — ECONOMIC STABILITY: INCOME INSECURITY: HOW HARD IS IT FOR YOU TO PAY FOR THE VERY BASICS LIKE FOOD, HOUSING, MEDICAL CARE, AND HEATING?: NOT VERY HARD

## 2024-01-29 SDOH — ECONOMIC STABILITY: FOOD INSECURITY: WITHIN THE PAST 12 MONTHS, THE FOOD YOU BOUGHT JUST DIDN'T LAST AND YOU DIDN'T HAVE MONEY TO GET MORE.: NEVER TRUE

## 2024-01-29 SDOH — ECONOMIC STABILITY: FOOD INSECURITY: WITHIN THE PAST 12 MONTHS, YOU WORRIED THAT YOUR FOOD WOULD RUN OUT BEFORE YOU GOT MONEY TO BUY MORE.: NEVER TRUE

## 2024-01-29 ASSESSMENT — PATIENT HEALTH QUESTIONNAIRE - PHQ9
SUM OF ALL RESPONSES TO PHQ QUESTIONS 1-9: 0
SUM OF ALL RESPONSES TO PHQ9 QUESTIONS 1 & 2: 0
1. LITTLE INTEREST OR PLEASURE IN DOING THINGS: 0
SUM OF ALL RESPONSES TO PHQ QUESTIONS 1-9: 0
2. FEELING DOWN, DEPRESSED OR HOPELESS: 0
SUM OF ALL RESPONSES TO PHQ QUESTIONS 1-9: 0
SUM OF ALL RESPONSES TO PHQ QUESTIONS 1-9: 0

## 2024-01-29 NOTE — PROGRESS NOTES
[facility]  Rut Aiken M.D.  [address] [place] [address]  Phone:  [phone]  Fax:  [phone]    CHIEF COMPLAINT:  Chief Complaint   Patient presents with    Thyroid Problem     Patients tsh is 1.53      Diabetes     Patient has not got his glucometer yet- the pharmacist says it has not come in yet. He was just barely able to get his Ozempic.     Hand Pain     Patient c/o right hand pain x1 month. He has been rubbing it with alcohol. He cannot straighten out his 5th finger.     Hypertension     Patient does  not check blood pressure at home, . Patient takes medications as prescribed. Denies chest pain, shortness of breath or headaches.           HISTORY OF PRESENT ILLNESS:  Mr. Dutton is a 63 y.o. male  who presents for follow up. He has not been able to check his blood sugars because the pharmacy does not have his glucometer in yet. His last A1c was 6.8. He has been out of his Ozempic for about 2 months.     His blood pressure has been under control.  He takes his medications daily as prescribed.  He denies any chest pain, shortness of breath, headaches, or blurred vision.    He is complaining of numbness in his right hand near the fifth digit.  He also has severely decreased range of motion of the fifth digits.  He has decreased range of motion of all digits both hands.  He is a .    Patient states that his younger brother was recently diagnosed with prostate cancer. He is 55 years old.     No other complaints. Taking medications as prescribed. Medications reviewed and updated.     HISTORY:  No Known Allergies    REVIEW OF SYSTEMS:  Review of systems is as indicated in HPI otherwise negative.    PHYSICAL EXAM:  Vital Signs -   Visit Vitals  /82   Pulse 85   Temp 97.5 °F (36.4 °C) (Temporal)   Ht 1.905 m (6' 3\")   Wt 83.4 kg (183 lb 14.4 oz)   SpO2 99%   BMI 22.99 kg/m²        Physical Exam  Vitals and nursing note reviewed.   Constitutional:

## 2024-01-30 LAB
EST. AVERAGE GLUCOSE BLD GHB EST-MCNC: 312 MG/DL
HBA1C MFR BLD: 12.5 % (ref 4.8–5.6)

## 2024-01-31 ENCOUNTER — TELEPHONE (OUTPATIENT)
Dept: PRIMARY CARE CLINIC | Facility: CLINIC | Age: 64
End: 2024-01-31

## 2024-01-31 DIAGNOSIS — E11.65 TYPE 2 DIABETES MELLITUS WITH HYPERGLYCEMIA, WITHOUT LONG-TERM CURRENT USE OF INSULIN (HCC): ICD-10-CM

## 2024-01-31 NOTE — TELEPHONE ENCOUNTER
Fax from pharmacy stating rx for glucometer must have brand name listed on rx. Left message for patient to call back, he will need to call his insurance and find out which brand is on his formulary so correct glucometer can be prescribed.

## 2024-02-04 DIAGNOSIS — D50.8 OTHER IRON DEFICIENCY ANEMIA: Primary | ICD-10-CM

## 2024-02-04 RX ORDER — FERROUS SULFATE 325(65) MG
325 TABLET ORAL
Qty: 90 TABLET | Refills: 3 | Status: SHIPPED | OUTPATIENT
Start: 2024-02-04

## 2024-02-06 RX ORDER — GLUCOSAMINE HCL/CHONDROITIN SU 500-400 MG
CAPSULE ORAL
Qty: 100 STRIP | Refills: 5 | Status: SHIPPED | OUTPATIENT
Start: 2024-02-06 | End: 2024-02-07

## 2024-02-07 RX ORDER — BLOOD-GLUCOSE METER
EACH MISCELLANEOUS
Qty: 100 EACH | Refills: 5 | Status: SHIPPED | OUTPATIENT
Start: 2024-02-07

## 2024-02-07 RX ORDER — BLOOD-GLUCOSE METER
EACH MISCELLANEOUS
Qty: 1 KIT | Refills: 0 | Status: SHIPPED | OUTPATIENT
Start: 2024-02-07

## 2024-02-07 RX ORDER — BLOOD SUGAR DIAGNOSTIC
STRIP MISCELLANEOUS
Qty: 100 EACH | Refills: 3 | Status: SHIPPED | OUTPATIENT
Start: 2024-02-07

## 2024-02-19 ENCOUNTER — OFFICE VISIT (OUTPATIENT)
Dept: ORTHOPEDIC SURGERY | Age: 64
End: 2024-02-19
Payer: COMMERCIAL

## 2024-02-19 VITALS — HEIGHT: 75 IN | BODY MASS INDEX: 22.13 KG/M2 | WEIGHT: 178 LBS

## 2024-02-19 DIAGNOSIS — G56.22 CUBITAL TUNNEL SYNDROME ON LEFT: ICD-10-CM

## 2024-02-19 DIAGNOSIS — M79.641 RIGHT HAND PAIN: Primary | ICD-10-CM

## 2024-02-19 DIAGNOSIS — M79.642 LEFT HAND PAIN: ICD-10-CM

## 2024-02-19 DIAGNOSIS — G56.21 CUBITAL TUNNEL SYNDROME ON RIGHT: ICD-10-CM

## 2024-02-19 PROCEDURE — G8428 CUR MEDS NOT DOCUMENT: HCPCS | Performed by: ORTHOPAEDIC SURGERY

## 2024-02-19 PROCEDURE — 1036F TOBACCO NON-USER: CPT | Performed by: ORTHOPAEDIC SURGERY

## 2024-02-19 PROCEDURE — G8484 FLU IMMUNIZE NO ADMIN: HCPCS | Performed by: ORTHOPAEDIC SURGERY

## 2024-02-19 PROCEDURE — 99203 OFFICE O/P NEW LOW 30 MIN: CPT | Performed by: ORTHOPAEDIC SURGERY

## 2024-02-19 PROCEDURE — G8420 CALC BMI NORM PARAMETERS: HCPCS | Performed by: ORTHOPAEDIC SURGERY

## 2024-02-19 PROCEDURE — 3017F COLORECTAL CA SCREEN DOC REV: CPT | Performed by: ORTHOPAEDIC SURGERY

## 2024-02-19 PROCEDURE — 1123F ACP DISCUSS/DSCN MKR DOCD: CPT | Performed by: ORTHOPAEDIC SURGERY

## 2024-02-19 NOTE — PROGRESS NOTES
Orthopaedic Hand Surgery Note    Name: Ivan Dutton  YOB: 1960  Gender: male  MRN: [id number]    CC: New patient referred for hand numbness      HPI: Patient is a 64 y.o. male with a chief complaint of bilateral hand numbness and tingling in the ulnar nerve distribution and difficulty straightening fingers. The symptoms have been going on for months. The patient does complain of night wakening and increased symptoms with driving. Evaluation to date has included none. Treatment to date has included none.     ROS/Meds/PSH/PMH/FH/SH: I personally reviewed the patients standard intake form.  Pertinents are discussed In the HPI    Physical Examination:    Musculoskeletal:     Examination of the bilateral upper extremity demonstrates Decreased sensation to light touch in the ulnar distribution, normal sensation in median and radial distribution, equivocal carpal tunnel compression testing and Phalen testing, cap refill < 5 seconds in all fingers. Inspection reveals no thenar atrophy, severe interosseous atrophy, worse on the right than the left. positive Tinel and elbow flexion compression test of the cubital tunnel, negative Tinel over Guyon's canal. positive Wartenberg sign, moderate ulnar clawing involving middle ring and small finger on the right, and small finger on the left.  No tenderness to palpation or masses noted in the forearm.    Imaging / Electrodiagnostic Tests:     Hand XR: AP, Lateral, Oblique and Thumb CMC joint     Clinical Indication:  1. Right hand pain    2. Left hand pain    3. Cubital tunnel syndrome on right    4. Cubital tunnel syndrome on left           Report: AP, lateral, oblique and thumb CMC joint x-ray of the bilateral hand demonstrates mild diffuse degenerative changes    Impression: as above     Zully Robbins MD         Assessment:     ICD-10-CM    1. Right hand pain  M79.641 XR HAND BILATERAL MINIMUM 3 VW      2. Left hand pain  M79.642 XR HAND BILATERAL

## 2024-02-21 ENCOUNTER — TELEPHONE (OUTPATIENT)
Dept: PRIMARY CARE CLINIC | Facility: CLINIC | Age: 64
End: 2024-02-21

## 2024-02-21 NOTE — TELEPHONE ENCOUNTER
Pt called stating [facility] doesn't have the One Touch Verio prescription. Informed it was sent on 2/7 but I would let the nurse know.

## 2024-02-28 PROBLEM — Z12.5 SCREENING FOR PROSTATE CANCER: Status: RESOLVED | Noted: 2024-01-29 | Resolved: 2024-02-28

## 2024-03-25 ENCOUNTER — PROCEDURE VISIT (OUTPATIENT)
Dept: NEUROLOGY | Age: 64
End: 2024-03-25
Payer: COMMERCIAL

## 2024-03-25 VITALS — WEIGHT: 185 LBS | OXYGEN SATURATION: 90 % | HEART RATE: 65 BPM | BODY MASS INDEX: 23.12 KG/M2

## 2024-03-25 DIAGNOSIS — R20.2 PARESTHESIA OF BOTH HANDS: Primary | ICD-10-CM

## 2024-03-25 DIAGNOSIS — E11.65 POORLY CONTROLLED DIABETES MELLITUS (HCC): ICD-10-CM

## 2024-03-25 DIAGNOSIS — E11.42 DIABETIC POLYNEUROPATHY ASSOCIATED WITH TYPE 2 DIABETES MELLITUS (HCC): ICD-10-CM

## 2024-03-25 DIAGNOSIS — R29.898 WEAKNESS OF BOTH HANDS: ICD-10-CM

## 2024-03-25 PROCEDURE — 95885 MUSC TST DONE W/NERV TST LIM: CPT | Performed by: PSYCHIATRY & NEUROLOGY

## 2024-03-25 PROCEDURE — 95913 NRV CNDJ TEST 13/> STUDIES: CPT | Performed by: PSYCHIATRY & NEUROLOGY

## 2024-03-25 NOTE — PROGRESS NOTES
EMG/Nerve Conduction Study Procedure Note  [address]    [address]  [place], [place]  [id number] [phone]      Hx:    Exam:     64 y.o.  B/AA male   re:   Cubital TS bilat.   Diabetic ==  EMG BUE  . A1C high of 13.5.  atrophy right hand intrinsics severe joaquin FDI - left to a much less degree.  No Tinel.  No Boothe or Misha.   Referring ::   Dr Andrea Friend   Tech::   Kiahfrank Bellean  Hgt::  6'3\"          Summary        upper extremity muscles as selected below with CV.             Controlled environmental factors / EMG lab.  Temperature.   NCV : sensory segments:    Severely and markedly abnormal = ABSENT /no response of the SNAP of bilateral median and bilateral ulnar SCV.  The bilateral radial with significant slowing bilateral radial nerve SCV as well as severe attenuated SNAP.  NCV transcarpal sensory segments:     Severely and markedly abnormal = ABSENT/  No response of the SNAP of the right median right ulnar transcarpal segments as well as the left similar.  NCV Motor MCV segments:     Markedly abnormal = the worst is at the right ulnar = all tested segments to the ADM are absent.  Left ulnar markedly slowed at the elbow both to the FDI in the ADM as well as severe attenuation CMAP amplitudes.  Bilateral median nerve terminal latency prolonged at 6.71 ms on the right; left TL prolonged at 5.58 ms (UL = 4.15 ms).  Ulnar nerve slowing obviously is worst at the right ulnar but is significant left ulnar as well.  F-wave studies:    Markedly abnormal = absent right ulnar F waves as expected with markedly prolonged left ulnar and bilateral median F wave latencies.        NEEDLE EMG:   Tested muscles:: Bilateral FDI APB ADM = = markedly abnormal = right FDI and ADM = 2 to 3+, 3+, 3-4+ IA fib PSW with very few MUP and mostly DA of the right FDI and equivocal similar at the APB without the fibrillations.  Reduced at the right ADM.  There is left ADM 3+ 1+ 1+ IA fib PSW with discrete activity without any giant

## 2024-07-11 DIAGNOSIS — I10 ESSENTIAL (PRIMARY) HYPERTENSION: ICD-10-CM

## 2024-07-11 RX ORDER — CHLORTHALIDONE 50 MG/1
50 TABLET ORAL DAILY
Qty: 90 TABLET | Refills: 3 | Status: SHIPPED | OUTPATIENT
Start: 2024-07-11

## 2024-07-12 DIAGNOSIS — I10 ESSENTIAL (PRIMARY) HYPERTENSION: ICD-10-CM

## 2024-07-12 RX ORDER — AMLODIPINE BESYLATE 10 MG/1
10 TABLET ORAL DAILY
Qty: 90 TABLET | Refills: 2 | OUTPATIENT
Start: 2024-07-12

## 2024-07-22 ENCOUNTER — OFFICE VISIT (OUTPATIENT)
Dept: PRIMARY CARE CLINIC | Facility: CLINIC | Age: 64
End: 2024-07-22
Payer: COMMERCIAL

## 2024-07-22 VITALS
TEMPERATURE: 97.9 F | OXYGEN SATURATION: 99 % | SYSTOLIC BLOOD PRESSURE: 138 MMHG | WEIGHT: 187.7 LBS | BODY MASS INDEX: 23.34 KG/M2 | HEART RATE: 82 BPM | DIASTOLIC BLOOD PRESSURE: 76 MMHG | HEIGHT: 75 IN

## 2024-07-22 DIAGNOSIS — Z79.899 ENCOUNTER FOR LONG-TERM (CURRENT) USE OF MEDICATIONS: ICD-10-CM

## 2024-07-22 DIAGNOSIS — M54.6 CHRONIC BILATERAL THORACIC BACK PAIN: ICD-10-CM

## 2024-07-22 DIAGNOSIS — E78.2 MIXED HYPERLIPIDEMIA: ICD-10-CM

## 2024-07-22 DIAGNOSIS — G89.29 CHRONIC BILATERAL THORACIC BACK PAIN: ICD-10-CM

## 2024-07-22 DIAGNOSIS — E55.9 VITAMIN D DEFICIENCY: ICD-10-CM

## 2024-07-22 DIAGNOSIS — I10 ESSENTIAL (PRIMARY) HYPERTENSION: ICD-10-CM

## 2024-07-22 DIAGNOSIS — E11.65 TYPE 2 DIABETES MELLITUS WITH HYPERGLYCEMIA, WITHOUT LONG-TERM CURRENT USE OF INSULIN (HCC): Primary | ICD-10-CM

## 2024-07-22 LAB
25(OH)D3 SERPL-MCNC: 43.2 NG/ML (ref 30–100)
ALBUMIN SERPL-MCNC: 3.6 G/DL (ref 3.2–4.6)
ALBUMIN/GLOB SERPL: 0.8 (ref 1–1.9)
ALP SERPL-CCNC: 125 U/L (ref 40–129)
ALT SERPL-CCNC: 13 U/L (ref 12–65)
ANION GAP SERPL CALC-SCNC: 11 MMOL/L (ref 9–18)
AST SERPL-CCNC: 22 U/L (ref 15–37)
BASOPHILS # BLD: 0.1 K/UL (ref 0–0.2)
BASOPHILS NFR BLD: 1 % (ref 0–2)
BILIRUB SERPL-MCNC: 0.5 MG/DL (ref 0–1.2)
BUN SERPL-MCNC: 14 MG/DL (ref 8–23)
CALCIUM SERPL-MCNC: 9.8 MG/DL (ref 8.8–10.2)
CHLORIDE SERPL-SCNC: 99 MMOL/L (ref 98–107)
CHOLEST SERPL-MCNC: 219 MG/DL (ref 0–200)
CO2 SERPL-SCNC: 25 MMOL/L (ref 20–28)
CREAT SERPL-MCNC: 1.18 MG/DL (ref 0.8–1.3)
DIFFERENTIAL METHOD BLD: ABNORMAL
EOSINOPHIL # BLD: 0.6 K/UL (ref 0–0.8)
EOSINOPHIL NFR BLD: 7 % (ref 0.5–7.8)
ERYTHROCYTE [DISTWIDTH] IN BLOOD BY AUTOMATED COUNT: 11.6 % (ref 11.9–14.6)
EST. AVERAGE GLUCOSE BLD GHB EST-MCNC: 154 MG/DL
GLOBULIN SER CALC-MCNC: 4.3 G/DL (ref 2.3–3.5)
GLUCOSE SERPL-MCNC: 150 MG/DL (ref 70–99)
HBA1C MFR BLD: 7 % (ref 0–5.6)
HCT VFR BLD AUTO: 36 % (ref 41.1–50.3)
HDLC SERPL-MCNC: 75 MG/DL (ref 40–60)
HDLC SERPL: 2.9 (ref 0–5)
HGB BLD-MCNC: 11.7 G/DL (ref 13.6–17.2)
IMM GRANULOCYTES # BLD AUTO: 0 K/UL (ref 0–0.5)
IMM GRANULOCYTES NFR BLD AUTO: 0 % (ref 0–5)
LDLC SERPL CALC-MCNC: 130 MG/DL (ref 0–100)
LYMPHOCYTES # BLD: 1.6 K/UL (ref 0.5–4.6)
LYMPHOCYTES NFR BLD: 19 % (ref 13–44)
MCH RBC QN AUTO: 31.9 PG (ref 26.1–32.9)
MCHC RBC AUTO-ENTMCNC: 32.5 G/DL (ref 31.4–35)
MCV RBC AUTO: 98.1 FL (ref 82–102)
MONOCYTES # BLD: 0.5 K/UL (ref 0.1–1.3)
MONOCYTES NFR BLD: 5 % (ref 4–12)
NEUTS SEG # BLD: 5.7 K/UL (ref 1.7–8.2)
NEUTS SEG NFR BLD: 68 % (ref 43–78)
NRBC # BLD: 0 K/UL (ref 0–0.2)
PLATELET # BLD AUTO: 322 K/UL (ref 150–450)
PMV BLD AUTO: 10.1 FL (ref 9.4–12.3)
POTASSIUM SERPL-SCNC: 4.5 MMOL/L (ref 3.5–5.1)
PROT SERPL-MCNC: 7.9 G/DL (ref 6.3–8.2)
RBC # BLD AUTO: 3.67 M/UL (ref 4.23–5.6)
SODIUM SERPL-SCNC: 134 MMOL/L (ref 136–145)
TRIGL SERPL-MCNC: 67 MG/DL (ref 0–150)
VLDLC SERPL CALC-MCNC: 13 MG/DL (ref 6–23)
WBC # BLD AUTO: 8.5 K/UL (ref 4.3–11.1)

## 2024-07-22 PROCEDURE — 3078F DIAST BP <80 MM HG: CPT | Performed by: FAMILY MEDICINE

## 2024-07-22 PROCEDURE — G8427 DOCREV CUR MEDS BY ELIG CLIN: HCPCS | Performed by: FAMILY MEDICINE

## 2024-07-22 PROCEDURE — G8420 CALC BMI NORM PARAMETERS: HCPCS | Performed by: FAMILY MEDICINE

## 2024-07-22 PROCEDURE — 99214 OFFICE O/P EST MOD 30 MIN: CPT | Performed by: FAMILY MEDICINE

## 2024-07-22 PROCEDURE — 3051F HG A1C>EQUAL 7.0%<8.0%: CPT | Performed by: FAMILY MEDICINE

## 2024-07-22 PROCEDURE — 2022F DILAT RTA XM EVC RTNOPTHY: CPT | Performed by: FAMILY MEDICINE

## 2024-07-22 PROCEDURE — 1123F ACP DISCUSS/DSCN MKR DOCD: CPT | Performed by: FAMILY MEDICINE

## 2024-07-22 PROCEDURE — 1036F TOBACCO NON-USER: CPT | Performed by: FAMILY MEDICINE

## 2024-07-22 PROCEDURE — 3017F COLORECTAL CA SCREEN DOC REV: CPT | Performed by: FAMILY MEDICINE

## 2024-07-22 PROCEDURE — 3075F SYST BP GE 130 - 139MM HG: CPT | Performed by: FAMILY MEDICINE

## 2024-07-22 RX ORDER — PIOGLITAZONEHYDROCHLORIDE 15 MG/1
15 TABLET ORAL DAILY
Qty: 90 TABLET | Refills: 3 | Status: SHIPPED | OUTPATIENT
Start: 2024-07-22

## 2024-07-22 RX ORDER — ERGOCALCIFEROL 1.25 MG/1
50000 CAPSULE ORAL
Qty: 6 CAPSULE | Refills: 3 | Status: SHIPPED | OUTPATIENT
Start: 2024-07-22

## 2024-07-22 RX ORDER — BACLOFEN 10 MG/1
10 TABLET ORAL 3 TIMES DAILY
Qty: 90 TABLET | Refills: 5 | Status: SHIPPED | OUTPATIENT
Start: 2024-07-22

## 2024-07-22 NOTE — PROGRESS NOTES
(ERGOCALCIFEROL) 1.25 MG (21670 UT) capsule    Vitamin D 25 Hydroxy      6. Encounter for long-term (current) use of medications  CBC with Auto Differential    Comprehensive Metabolic Panel          Follow up and Dispositions:  Return in about 4 months (around 11/22/2024).     Patient will continue current medications.  Refilled the above medications.  Will change the following medications: Rybelsus to Actos 15 mg daily.  Reviewed medications and side effects in detail.  Will check the above labs.  Reviewed most recent labs.  Reviewed diet, exercise and weight control.  Cardiovascular risks and recommendations reviewed.  Patient encouraged to follow a diabetic/low sodium diet.  Use of aspirin to prevent MIs and TIAs discussed.   Patient will check blood sugars 2x daily.    I have reviewed the patient's past medical history, social history and family history and vitals.  We have discussed treatment plan and follow up and given patient instructions.  Patient's questions are answered and we will follow up as indicated.    1. Hypertension  - Blood pressure is well-controlled at 138/76. Continue current management.    2. Type 2 Diabetes Mellitus  - Patient reports poor blood sugar control and has not been monitoring levels.  - Plan: Continue Ozempic 2 mg and discontinue Rybelsus. Consider adding Actos at a low dose. Educate the patient on the importance of regular blood sugar monitoring and maintaining a healthy lifestyle.    3. Fatigue  - Patient reports feeling tired and inquires about a multivitamin.  - Plan: Recommend over-the-counter multivitamins such as Centrum Silver, Geritol, or One-a-Day. Consider checking hemoglobin levels if fatigue persists.    4. No chest pain or shortness of breath  - Continue to monitor for any changes in symptoms.    5. Lower back pain  - Patient reports lower back pain, particularly during driving.  - Plan: Encourage patient to take breaks during long drives and consider

## 2024-07-25 NOTE — RESULT ENCOUNTER NOTE
1. Sodium slightly low at 134. Potassium good at 4.5. will continue to monitor.     2. Cholesterol: 219 (was 218); LDL: 130 (was 142); HDL: good at 75 - will continue to monitor.     3. HbA1c: 7.0 (was 12.5) - excellent. Will continue to monitor.     Remainder of your labs are good.

## 2024-07-29 ENCOUNTER — TELEPHONE (OUTPATIENT)
Dept: PRIMARY CARE CLINIC | Facility: CLINIC | Age: 64
End: 2024-07-29

## 2024-07-29 NOTE — TELEPHONE ENCOUNTER
[place] with [facility] called and stated that the prescription that they received from the patient only had instructions and that their was no name form the medication on the prescription. She said it said Unable To find.  Phone [phone]  Fax [phone].

## 2024-08-12 ENCOUNTER — TELEPHONE (OUTPATIENT)
Dept: PRIMARY CARE CLINIC | Facility: CLINIC | Age: 64
End: 2024-08-12

## 2024-08-12 NOTE — TELEPHONE ENCOUNTER
[facility] has a question on the trimix - wants to know if the same strength is requested    [phone]  
Spoke to pharmacist about pt.   
normal...

## 2024-09-29 RX ORDER — ORAL SEMAGLUTIDE 7 MG/1
1 TABLET ORAL DAILY
Qty: 90 TABLET | Refills: 3 | Status: SHIPPED | OUTPATIENT
Start: 2024-09-29

## 2024-10-03 DIAGNOSIS — E11.65 TYPE 2 DIABETES MELLITUS WITH HYPERGLYCEMIA, WITHOUT LONG-TERM CURRENT USE OF INSULIN (HCC): ICD-10-CM

## 2024-10-03 RX ORDER — SEMAGLUTIDE 2.68 MG/ML
INJECTION, SOLUTION SUBCUTANEOUS
Refills: 47 | OUTPATIENT
Start: 2024-10-03

## 2024-11-25 ENCOUNTER — OFFICE VISIT (OUTPATIENT)
Dept: PRIMARY CARE CLINIC | Facility: CLINIC | Age: 64
End: 2024-11-25
Payer: COMMERCIAL

## 2024-11-25 VITALS
OXYGEN SATURATION: 98 % | SYSTOLIC BLOOD PRESSURE: 131 MMHG | HEIGHT: 75 IN | HEART RATE: 80 BPM | DIASTOLIC BLOOD PRESSURE: 85 MMHG | BODY MASS INDEX: 24.49 KG/M2 | TEMPERATURE: 97.8 F | WEIGHT: 197 LBS

## 2024-11-25 DIAGNOSIS — Z79.899 ENCOUNTER FOR LONG-TERM (CURRENT) USE OF MEDICATIONS: ICD-10-CM

## 2024-11-25 DIAGNOSIS — Z23 NEED FOR INFLUENZA VACCINATION: ICD-10-CM

## 2024-11-25 DIAGNOSIS — E55.9 VITAMIN D DEFICIENCY: ICD-10-CM

## 2024-11-25 DIAGNOSIS — E11.65 TYPE 2 DIABETES MELLITUS WITH HYPERGLYCEMIA, WITHOUT LONG-TERM CURRENT USE OF INSULIN (HCC): Primary | ICD-10-CM

## 2024-11-25 DIAGNOSIS — E78.2 MIXED HYPERLIPIDEMIA: ICD-10-CM

## 2024-11-25 DIAGNOSIS — I10 ESSENTIAL (PRIMARY) HYPERTENSION: ICD-10-CM

## 2024-11-25 LAB
25(OH)D3 SERPL-MCNC: 48.6 NG/ML (ref 30–100)
ALBUMIN SERPL-MCNC: 3.5 G/DL (ref 3.2–4.6)
ALBUMIN/GLOB SERPL: 1 (ref 1–1.9)
ALP SERPL-CCNC: 108 U/L (ref 40–129)
ALT SERPL-CCNC: 13 U/L (ref 8–55)
ANION GAP SERPL CALC-SCNC: 10 MMOL/L (ref 7–16)
AST SERPL-CCNC: 17 U/L (ref 15–37)
BASOPHILS # BLD: 0.1 K/UL (ref 0–0.2)
BASOPHILS NFR BLD: 1 % (ref 0–2)
BILIRUB SERPL-MCNC: 0.4 MG/DL (ref 0–1.2)
BUN SERPL-MCNC: 16 MG/DL (ref 8–23)
CALCIUM SERPL-MCNC: 9.2 MG/DL (ref 8.8–10.2)
CHLORIDE SERPL-SCNC: 100 MMOL/L (ref 98–107)
CHOLEST SERPL-MCNC: 233 MG/DL (ref 0–200)
CO2 SERPL-SCNC: 28 MMOL/L (ref 20–29)
CREAT SERPL-MCNC: 1.38 MG/DL (ref 0.8–1.3)
CREAT UR-MCNC: 118 MG/DL (ref 39–259)
DIFFERENTIAL METHOD BLD: ABNORMAL
EOSINOPHIL # BLD: 0.4 K/UL (ref 0–0.8)
EOSINOPHIL NFR BLD: 5 % (ref 0.5–7.8)
ERYTHROCYTE [DISTWIDTH] IN BLOOD BY AUTOMATED COUNT: 11.5 % (ref 11.9–14.6)
EST. AVERAGE GLUCOSE BLD GHB EST-MCNC: 187 MG/DL
GLOBULIN SER CALC-MCNC: 3.5 G/DL (ref 2.3–3.5)
GLUCOSE SERPL-MCNC: 171 MG/DL (ref 70–99)
HBA1C MFR BLD: 8.2 % (ref 0–5.6)
HCT VFR BLD AUTO: 34.1 % (ref 41.1–50.3)
HDLC SERPL-MCNC: 69 MG/DL (ref 40–60)
HDLC SERPL: 3.4 (ref 0–5)
HGB BLD-MCNC: 11 G/DL (ref 13.6–17.2)
IMM GRANULOCYTES # BLD AUTO: 0 K/UL (ref 0–0.5)
IMM GRANULOCYTES NFR BLD AUTO: 0 % (ref 0–5)
LDLC SERPL CALC-MCNC: 148 MG/DL (ref 0–100)
LYMPHOCYTES # BLD: 1.9 K/UL (ref 0.5–4.6)
LYMPHOCYTES NFR BLD: 25 % (ref 13–44)
MCH RBC QN AUTO: 31.9 PG (ref 26.1–32.9)
MCHC RBC AUTO-ENTMCNC: 32.3 G/DL (ref 31.4–35)
MCV RBC AUTO: 98.8 FL (ref 82–102)
MICROALBUMIN UR-MCNC: 13.2 MG/DL (ref 0–20)
MICROALBUMIN/CREAT UR-RTO: 112 MG/G (ref 0–30)
MONOCYTES # BLD: 0.5 K/UL (ref 0.1–1.3)
MONOCYTES NFR BLD: 7 % (ref 4–12)
NEUTS SEG # BLD: 4.6 K/UL (ref 1.7–8.2)
NEUTS SEG NFR BLD: 62 % (ref 43–78)
NRBC # BLD: 0 K/UL (ref 0–0.2)
PLATELET # BLD AUTO: 247 K/UL (ref 150–450)
PMV BLD AUTO: 9.8 FL (ref 9.4–12.3)
POTASSIUM SERPL-SCNC: 4.2 MMOL/L (ref 3.5–5.1)
PROT SERPL-MCNC: 7 G/DL (ref 6.3–8.2)
RBC # BLD AUTO: 3.45 M/UL (ref 4.23–5.6)
SODIUM SERPL-SCNC: 137 MMOL/L (ref 136–145)
TRIGL SERPL-MCNC: 79 MG/DL (ref 0–150)
VLDLC SERPL CALC-MCNC: 16 MG/DL (ref 6–23)
WBC # BLD AUTO: 7.5 K/UL (ref 4.3–11.1)

## 2024-11-25 PROCEDURE — 3079F DIAST BP 80-89 MM HG: CPT | Performed by: FAMILY MEDICINE

## 2024-11-25 PROCEDURE — 3052F HG A1C>EQUAL 8.0%<EQUAL 9.0%: CPT | Performed by: FAMILY MEDICINE

## 2024-11-25 PROCEDURE — 2022F DILAT RTA XM EVC RTNOPTHY: CPT | Performed by: FAMILY MEDICINE

## 2024-11-25 PROCEDURE — 90653 IIV ADJUVANT VACCINE IM: CPT | Performed by: FAMILY MEDICINE

## 2024-11-25 PROCEDURE — 1123F ACP DISCUSS/DSCN MKR DOCD: CPT | Performed by: FAMILY MEDICINE

## 2024-11-25 PROCEDURE — 90471 IMMUNIZATION ADMIN: CPT | Performed by: FAMILY MEDICINE

## 2024-11-25 PROCEDURE — G8427 DOCREV CUR MEDS BY ELIG CLIN: HCPCS | Performed by: FAMILY MEDICINE

## 2024-11-25 PROCEDURE — 99214 OFFICE O/P EST MOD 30 MIN: CPT | Performed by: FAMILY MEDICINE

## 2024-11-25 PROCEDURE — G8482 FLU IMMUNIZE ORDER/ADMIN: HCPCS | Performed by: FAMILY MEDICINE

## 2024-11-25 PROCEDURE — G8420 CALC BMI NORM PARAMETERS: HCPCS | Performed by: FAMILY MEDICINE

## 2024-11-25 PROCEDURE — 1036F TOBACCO NON-USER: CPT | Performed by: FAMILY MEDICINE

## 2024-11-25 PROCEDURE — 3017F COLORECTAL CA SCREEN DOC REV: CPT | Performed by: FAMILY MEDICINE

## 2024-11-25 PROCEDURE — 3075F SYST BP GE 130 - 139MM HG: CPT | Performed by: FAMILY MEDICINE

## 2024-11-25 RX ORDER — CHLORTHALIDONE 50 MG/1
50 TABLET ORAL DAILY
Qty: 90 TABLET | Refills: 3 | Status: SHIPPED | OUTPATIENT
Start: 2024-11-25

## 2024-11-25 RX ORDER — SEMAGLUTIDE 2.68 MG/ML
2 INJECTION, SOLUTION SUBCUTANEOUS
Qty: 9 ML | Refills: 5 | Status: SHIPPED | OUTPATIENT
Start: 2024-11-25

## 2024-11-25 RX ORDER — PIOGLITAZONE 15 MG/1
15 TABLET ORAL DAILY
Qty: 90 TABLET | Refills: 3 | Status: SHIPPED | OUTPATIENT
Start: 2024-11-25

## 2024-11-25 RX ORDER — ERGOCALCIFEROL 1.25 MG/1
50000 CAPSULE ORAL
Qty: 6 CAPSULE | Refills: 3 | Status: SHIPPED | OUTPATIENT
Start: 2024-11-25

## 2024-11-25 RX ORDER — AMLODIPINE BESYLATE 10 MG/1
10 TABLET ORAL DAILY
Qty: 90 TABLET | Refills: 3 | Status: SHIPPED | OUTPATIENT
Start: 2024-11-25

## 2024-11-25 NOTE — PROGRESS NOTES
[facility] PR[facility]  Rut Aiken M.D.  [address] [place] [address]  Phone:  [phone]  Fax:  [phone]    CHIEF COMPLAINT:  Chief Complaint   Patient presents with    Diabetes     Patient states blood sugars have been good. Patient does not check his blood sugar. Patient is compliant with medication use. Patient denies symptoms of neuropathy. Patient denies wounds that won't heal.       Hypertension     Patient does check blood pressure at home, reports their blood pressure has been good. Patient takes medications as prescribed. Denies chest pain, shortness of breath or headaches.   Patient states druggist stopped giving him the amlodipine, he is not sure why.     Cholesterol Problem     Patient is compliant with medication use for cholesterol problem. Denies adverse effects of medication use.           HISTORY OF PRESENT ILLNESS:  Mr. Dutton is a 64 y.o. male  who presents for follow up. The patient, Ivan Dutton, reports no chief complaint during the visit. He mentions that his blood pressure and blood sugars have been doing well, although he has not been checking them regularly. He denies any complaints or issues with his medications.    Ivan' last A1C in July was 7.0, which was an improvement from a previous value of 12. His cholesterol was 219, with a good cholesterol level of 75. He denies any chest pain, shortness of breath, or abdominal pain during the visit.    The patient's blood pressure was measured at 131/85 during the visit, which the provider noted as looking good. Ivan confirmed that he doesn't have any complaints and takes his medications as prescribed. He mentioned that the [facility] pharmacy at [facility] can be slow with prescriptions. The patient did not report any side effects or issues with his overnight self-treatment.    HISTORY:  No Known Allergies    REVIEW OF SYSTEMS:  Review of systems is as indicated in HPI otherwise negative.    PHYSICAL EXAM:  Visit

## 2025-01-08 DIAGNOSIS — E78.2 MIXED HYPERLIPIDEMIA: ICD-10-CM

## 2025-01-08 RX ORDER — EZETIMIBE 10 MG/1
10 TABLET ORAL DAILY
Qty: 90 TABLET | Refills: 3 | OUTPATIENT
Start: 2025-01-08

## 2025-01-18 DIAGNOSIS — M54.6 CHRONIC BILATERAL THORACIC BACK PAIN: ICD-10-CM

## 2025-01-18 DIAGNOSIS — G89.29 CHRONIC BILATERAL THORACIC BACK PAIN: ICD-10-CM

## 2025-01-18 RX ORDER — BACLOFEN 10 MG/1
10 TABLET ORAL 3 TIMES DAILY
Qty: 270 TABLET | Refills: 3 | Status: SHIPPED | OUTPATIENT
Start: 2025-01-18

## 2025-03-13 ENCOUNTER — RESULTS FOLLOW-UP (OUTPATIENT)
Dept: PRIMARY CARE CLINIC | Facility: CLINIC | Age: 65
End: 2025-03-13

## 2025-03-13 NOTE — RESULT ENCOUNTER NOTE
for other causes of anemia.  § Diet: Focus on iron-rich foods like spinach, red meat, and beans.  4. Cholesterol & Heart Health  º Your total cholesterol is high at 233 and your LDL (\"bad\" cholesterol) is 148, which increases heart disease risk.  º HDL (\"good\" cholesterol) is 69, which is actually in a healthy range.  º Recommendation:  § Medication: You are on Zetia (ezetimibe), which helps lower cholesterol. If levels remain high, your provider may discuss adding a statin (e.g., atorvastatin or rosuvastatin).  § Diet & Lifestyle: Reduce saturated fats (found in fried foods and fatty meats) and increase fiber intake from vegetables and whole grains.  5. Vitamin D Levels  º Your Vitamin D is 48.6, which is within the normal range, meaning your supplementation is working.  º Recommendation: Continue taking ergocalciferol (Vitamin D) every two weeks as prescribed.    Next Steps:  · Follow up with your PCP to discuss medication adjustments for diabetes and cholesterol.  · Monitor blood sugar closely and consider dietary changes.  · Continue iron supplementation for anemia.  · Recheck kidney function in a few months.      [employer] message sent as well    Explanatory note: Be assured that the information provided to create your medical record comes from your provider. The written transcription portion of this note is prepared electronically by voice-recognition software. At times, there may be some errors in capitalization, punctuation, tense, or context that are inherent in the system, but your provider reviews the note for content and to ensure that the note contains appropriate information for your continuing care.    If any special recommendations were made during the most recent visit, I recommend following up with provider. This is a generalized interpretation of your lab results.

## 2025-04-02 DIAGNOSIS — D50.8 OTHER IRON DEFICIENCY ANEMIA: ICD-10-CM

## 2025-04-02 RX ORDER — FERROUS SULFATE 325(65) MG
325 TABLET ORAL
Qty: 90 TABLET | Refills: 1 | OUTPATIENT
Start: 2025-04-02

## 2025-06-02 ENCOUNTER — OFFICE VISIT (OUTPATIENT)
Dept: PRIMARY CARE CLINIC | Facility: CLINIC | Age: 65
End: 2025-06-02
Payer: COMMERCIAL

## 2025-06-02 ENCOUNTER — RESULTS FOLLOW-UP (OUTPATIENT)
Dept: PRIMARY CARE CLINIC | Facility: CLINIC | Age: 65
End: 2025-06-02

## 2025-06-02 ENCOUNTER — TRANSCRIBE ORDERS (OUTPATIENT)
Dept: SCHEDULING | Age: 65
End: 2025-06-02

## 2025-06-02 VITALS
OXYGEN SATURATION: 97 % | HEART RATE: 84 BPM | BODY MASS INDEX: 24.59 KG/M2 | DIASTOLIC BLOOD PRESSURE: 76 MMHG | HEIGHT: 75 IN | WEIGHT: 197.8 LBS | TEMPERATURE: 98.2 F | SYSTOLIC BLOOD PRESSURE: 130 MMHG

## 2025-06-02 DIAGNOSIS — E55.9 VITAMIN D DEFICIENCY: ICD-10-CM

## 2025-06-02 DIAGNOSIS — R10.12 ABDOMINAL PAIN, ACUTE, LEFT UPPER QUADRANT: ICD-10-CM

## 2025-06-02 DIAGNOSIS — I10 ESSENTIAL (PRIMARY) HYPERTENSION: ICD-10-CM

## 2025-06-02 DIAGNOSIS — E11.65 TYPE 2 DIABETES MELLITUS WITH HYPERGLYCEMIA, WITHOUT LONG-TERM CURRENT USE OF INSULIN (HCC): ICD-10-CM

## 2025-06-02 DIAGNOSIS — R07.9 RIGHT-SIDED CHEST PAIN: ICD-10-CM

## 2025-06-02 DIAGNOSIS — E78.2 MIXED HYPERLIPIDEMIA: ICD-10-CM

## 2025-06-02 DIAGNOSIS — Z79.899 ENCOUNTER FOR LONG-TERM (CURRENT) USE OF MEDICATIONS: ICD-10-CM

## 2025-06-02 DIAGNOSIS — R19.7 DIARRHEA, UNSPECIFIED TYPE: ICD-10-CM

## 2025-06-02 DIAGNOSIS — R10.12 LEFT UPPER QUADRANT PAIN: Primary | ICD-10-CM

## 2025-06-02 DIAGNOSIS — E11.65 TYPE 2 DIABETES MELLITUS WITH HYPERGLYCEMIA, WITHOUT LONG-TERM CURRENT USE OF INSULIN (HCC): Primary | ICD-10-CM

## 2025-06-02 DIAGNOSIS — R10.32 ACUTE ABDOMINAL PAIN IN LEFT LOWER QUADRANT: ICD-10-CM

## 2025-06-02 DIAGNOSIS — Z12.11 SCREEN FOR COLON CANCER: ICD-10-CM

## 2025-06-02 DIAGNOSIS — D50.8 OTHER IRON DEFICIENCY ANEMIA: Primary | ICD-10-CM

## 2025-06-02 DIAGNOSIS — R11.2 NAUSEA AND VOMITING, UNSPECIFIED VOMITING TYPE: ICD-10-CM

## 2025-06-02 LAB
25(OH)D3 SERPL-MCNC: 43.9 NG/ML (ref 30–100)
ALBUMIN SERPL-MCNC: 3.3 G/DL (ref 3.2–4.6)
ALBUMIN/GLOB SERPL: 0.8 (ref 1–1.9)
ALP SERPL-CCNC: 110 U/L (ref 40–129)
ALT SERPL-CCNC: 14 U/L (ref 8–55)
ANION GAP SERPL CALC-SCNC: 11 MMOL/L (ref 7–16)
AST SERPL-CCNC: 19 U/L (ref 15–37)
BASOPHILS # BLD: 0.03 K/UL (ref 0–0.2)
BASOPHILS NFR BLD: 0.4 % (ref 0–2)
BILIRUB SERPL-MCNC: 0.3 MG/DL (ref 0–1.2)
BUN SERPL-MCNC: 24 MG/DL (ref 8–23)
CALCIUM SERPL-MCNC: 9.1 MG/DL (ref 8.8–10.2)
CHLORIDE SERPL-SCNC: 102 MMOL/L (ref 98–107)
CHOLEST SERPL-MCNC: 213 MG/DL (ref 0–200)
CO2 SERPL-SCNC: 25 MMOL/L (ref 20–29)
CREAT SERPL-MCNC: 1.27 MG/DL (ref 0.8–1.3)
CREAT UR-MCNC: 168 MG/DL (ref 39–259)
DIFFERENTIAL METHOD BLD: ABNORMAL
EOSINOPHIL # BLD: 0.46 K/UL (ref 0–0.8)
EOSINOPHIL NFR BLD: 5.9 % (ref 0.5–7.8)
ERYTHROCYTE [DISTWIDTH] IN BLOOD BY AUTOMATED COUNT: 11.6 % (ref 11.9–14.6)
EST. AVERAGE GLUCOSE BLD GHB EST-MCNC: 159 MG/DL
GLOBULIN SER CALC-MCNC: 3.9 G/DL (ref 2.3–3.5)
GLUCOSE SERPL-MCNC: 182 MG/DL (ref 70–99)
HBA1C MFR BLD: 7.2 % (ref 0–5.6)
HCT VFR BLD AUTO: 31.5 % (ref 41.1–50.3)
HDLC SERPL-MCNC: 58 MG/DL (ref 40–60)
HDLC SERPL: 3.7 (ref 0–5)
HGB BLD-MCNC: 10.3 G/DL (ref 13.6–17.2)
IMM GRANULOCYTES # BLD AUTO: 0.04 K/UL (ref 0–0.5)
IMM GRANULOCYTES NFR BLD AUTO: 0.5 % (ref 0–5)
LDLC SERPL CALC-MCNC: 141 MG/DL (ref 0–100)
LYMPHOCYTES # BLD: 1.36 K/UL (ref 0.5–4.6)
LYMPHOCYTES NFR BLD: 17.3 % (ref 13–44)
MCH RBC QN AUTO: 32.1 PG (ref 26.1–32.9)
MCHC RBC AUTO-ENTMCNC: 32.7 G/DL (ref 31.4–35)
MCV RBC AUTO: 98.1 FL (ref 82–102)
MICROALBUMIN UR-MCNC: 10.1 MG/DL (ref 0–20)
MICROALBUMIN/CREAT UR-RTO: 60 MG/G (ref 0–30)
MONOCYTES # BLD: 0.6 K/UL (ref 0.1–1.3)
MONOCYTES NFR BLD: 7.6 % (ref 4–12)
NEUTS SEG # BLD: 5.36 K/UL (ref 1.7–8.2)
NEUTS SEG NFR BLD: 68.3 % (ref 43–78)
NRBC # BLD: 0 K/UL (ref 0–0.2)
PLATELET # BLD AUTO: 254 K/UL (ref 150–450)
PMV BLD AUTO: 11 FL (ref 9.4–12.3)
POTASSIUM SERPL-SCNC: 4 MMOL/L (ref 3.5–5.1)
PROT SERPL-MCNC: 7.1 G/DL (ref 6.3–8.2)
RBC # BLD AUTO: 3.21 M/UL (ref 4.23–5.6)
SODIUM SERPL-SCNC: 138 MMOL/L (ref 136–145)
TRIGL SERPL-MCNC: 71 MG/DL (ref 0–150)
VLDLC SERPL CALC-MCNC: 14 MG/DL (ref 6–23)
WBC # BLD AUTO: 7.9 K/UL (ref 4.3–11.1)

## 2025-06-02 PROCEDURE — 1123F ACP DISCUSS/DSCN MKR DOCD: CPT | Performed by: FAMILY MEDICINE

## 2025-06-02 PROCEDURE — 2022F DILAT RTA XM EVC RTNOPTHY: CPT | Performed by: FAMILY MEDICINE

## 2025-06-02 PROCEDURE — 3017F COLORECTAL CA SCREEN DOC REV: CPT | Performed by: FAMILY MEDICINE

## 2025-06-02 PROCEDURE — 1036F TOBACCO NON-USER: CPT | Performed by: FAMILY MEDICINE

## 2025-06-02 PROCEDURE — G8420 CALC BMI NORM PARAMETERS: HCPCS | Performed by: FAMILY MEDICINE

## 2025-06-02 PROCEDURE — G8427 DOCREV CUR MEDS BY ELIG CLIN: HCPCS | Performed by: FAMILY MEDICINE

## 2025-06-02 PROCEDURE — 3078F DIAST BP <80 MM HG: CPT | Performed by: FAMILY MEDICINE

## 2025-06-02 PROCEDURE — 3075F SYST BP GE 130 - 139MM HG: CPT | Performed by: FAMILY MEDICINE

## 2025-06-02 PROCEDURE — 3046F HEMOGLOBIN A1C LEVEL >9.0%: CPT | Performed by: FAMILY MEDICINE

## 2025-06-02 PROCEDURE — 99214 OFFICE O/P EST MOD 30 MIN: CPT | Performed by: FAMILY MEDICINE

## 2025-06-02 RX ORDER — FERROUS SULFATE 325(65) MG
325 TABLET ORAL
Qty: 90 TABLET | Refills: 3 | Status: SHIPPED | OUTPATIENT
Start: 2025-06-02

## 2025-06-02 RX ORDER — SEMAGLUTIDE 2.68 MG/ML
2 INJECTION, SOLUTION SUBCUTANEOUS
Qty: 9 ML | Refills: 5 | Status: SHIPPED | OUTPATIENT
Start: 2025-06-02

## 2025-06-02 SDOH — ECONOMIC STABILITY: FOOD INSECURITY: WITHIN THE PAST 12 MONTHS, YOU WORRIED THAT YOUR FOOD WOULD RUN OUT BEFORE YOU GOT MONEY TO BUY MORE.: NEVER TRUE

## 2025-06-02 SDOH — ECONOMIC STABILITY: FOOD INSECURITY: WITHIN THE PAST 12 MONTHS, THE FOOD YOU BOUGHT JUST DIDN'T LAST AND YOU DIDN'T HAVE MONEY TO GET MORE.: NEVER TRUE

## 2025-06-02 ASSESSMENT — PATIENT HEALTH QUESTIONNAIRE - PHQ9
SUM OF ALL RESPONSES TO PHQ QUESTIONS 1-9: 0
2. FEELING DOWN, DEPRESSED OR HOPELESS: NOT AT ALL
1. LITTLE INTEREST OR PLEASURE IN DOING THINGS: NOT AT ALL

## 2025-06-02 NOTE — PROGRESS NOTES
Panel; Future  9. Vitamin D deficiency  -     Vitamin D 25 Hydroxy; Future  10. Screen for colon cancer  -     POCT FECAL IMMUNOCHEMICAL TEST (FIT) ()  11. Encounter for long-term (current) use of medications  -     CBC with Auto Differential; Future  -     Comprehensive Metabolic Panel; Future      Assessment & Plan  1. Left-sided abdominal pain.  - Pain described as sore and sometimes unbearable, with a history of regurgitation and diarrhea.  - Physical examination reveals tenderness in the left upper quadrant, with no significant bruising noted.  - Differential diagnosis includes diverticulitis or other bowel-related conditions.  - A CT scan of the abdomen will be ordered, and laboratory tests will be conducted to assess kidney function prior to the scan.    2. Medication management.  - The patient is currently taking diabetes and cholesterol medications as prescribed.  - A refill for semaglutide 2 mg has been sent to the pharmacy.  - The patient reported using ibuprofen and Aspercreme patches for pain relief, which provided temporary relief.       Reviewed most recent labs.     I have reviewed the patient's past medical history, social history and family history and vitals.  We have discussed treatment plan and follow up and given patient instructions.  Patient's questions are answered and we will follow up as indicated.    Return in about 6 months (around 12/2/2025).     Rut Aiken MD    ADDITIONAL EDUCATION    Last 7 days of labs:    No visits with results within 1 Week(s) from this visit.   Latest known visit with results is:   Office Visit on 11/25/2024   Component Date Value Ref Range Status    WBC 11/25/2024 7.5  4.3 - 11.1 K/uL Final    RBC 11/25/2024 3.45 (L)  4.23 - 5.6 M/uL Final    Hemoglobin 11/25/2024 11.0 (L)  13.6 - 17.2 g/dL Final    Hematocrit 11/25/2024 34.1 (L)  41.1 - 50.3 % Final    MCV 11/25/2024 98.8  82 - 102 FL Final    MCH 11/25/2024 31.9  26.1 - 32.9 PG Final    [facility]

## 2025-06-03 NOTE — RESULT ENCOUNTER NOTE
1.  Cholesterol: 213, was 233; LDL: 141, was 148; HDL: Good at 58, was 69.  Labs are slowly improving.  Will continue to monitor.    2.  Hemoglobin: Slightly low at 10.3, was 11.0. He is supposed to be taking iron daily. Will send a new prescription to the pharmacy.     3.  HbA1c: 7.2, was 8.2.  Slowly improving.  Will continue to monitor.    Remainder of his labs are good.

## 2025-06-09 ENCOUNTER — TELEPHONE (OUTPATIENT)
Dept: PRIMARY CARE CLINIC | Facility: CLINIC | Age: 65
End: 2025-06-09

## 2025-06-09 LAB
HEMOCCULT STL QL: NORMAL
VALID INTERNAL CONTROL: YES

## 2025-06-11 ENCOUNTER — PATIENT MESSAGE (OUTPATIENT)
Dept: PRIMARY CARE CLINIC | Facility: CLINIC | Age: 65
End: 2025-06-11

## 2025-06-14 ENCOUNTER — PATIENT MESSAGE (OUTPATIENT)
Dept: PRIMARY CARE CLINIC | Facility: CLINIC | Age: 65
End: 2025-06-14

## 2025-06-14 DIAGNOSIS — D50.8 OTHER IRON DEFICIENCY ANEMIA: ICD-10-CM

## 2025-06-14 DIAGNOSIS — N30.20 CHRONIC CYSTITIS: ICD-10-CM

## 2025-06-15 RX ORDER — FERROUS SULFATE 325(65) MG
325 TABLET ORAL
Qty: 90 TABLET | Refills: 3 | Status: SHIPPED | OUTPATIENT
Start: 2025-06-15

## 2025-06-15 RX ORDER — CIPROFLOXACIN 500 MG/1
500 TABLET, FILM COATED ORAL 2 TIMES DAILY
Qty: 28 TABLET | Refills: 0 | Status: SHIPPED | OUTPATIENT
Start: 2025-06-15 | End: 2025-06-29

## 2025-06-17 ENCOUNTER — TELEPHONE (OUTPATIENT)
Dept: PRIMARY CARE CLINIC | Facility: CLINIC | Age: 65
End: 2025-06-17

## 2025-06-17 NOTE — TELEPHONE ENCOUNTER
[facility] called stating they received a prescription with no prescription name that was sent on 06/10

## 2025-06-17 NOTE — TELEPHONE ENCOUNTER
Left message that this matter has already been settled, I spoke to someone last week from that pharmacy, they are aware of the Trimix the patient needs and were going to get it ready for the patient.

## 2025-08-26 DIAGNOSIS — E11.65 TYPE 2 DIABETES MELLITUS WITH HYPERGLYCEMIA, WITHOUT LONG-TERM CURRENT USE OF INSULIN (HCC): ICD-10-CM

## 2025-08-26 RX ORDER — PIOGLITAZONE 15 MG/1
15 TABLET ORAL DAILY
Qty: 90 TABLET | Refills: 3 | Status: SHIPPED | OUTPATIENT
Start: 2025-08-26

## 2025-09-04 ENCOUNTER — TELEMEDICINE (OUTPATIENT)
Dept: PRIMARY CARE CLINIC | Facility: CLINIC | Age: 65
End: 2025-09-04
Payer: COMMERCIAL

## 2025-09-04 DIAGNOSIS — R52 BODY ACHES: ICD-10-CM

## 2025-09-04 DIAGNOSIS — E55.9 VITAMIN D DEFICIENCY: ICD-10-CM

## 2025-09-04 DIAGNOSIS — Z12.11 SCREEN FOR COLON CANCER: ICD-10-CM

## 2025-09-04 DIAGNOSIS — Z80.0 FAMILY HISTORY OF COLON CANCER: ICD-10-CM

## 2025-09-04 DIAGNOSIS — R51.9 SINUS HEADACHE: ICD-10-CM

## 2025-09-04 DIAGNOSIS — Z20.822 EXPOSURE TO CONFIRMED CASE OF COVID-19: ICD-10-CM

## 2025-09-04 DIAGNOSIS — D50.8 OTHER IRON DEFICIENCY ANEMIA: ICD-10-CM

## 2025-09-04 DIAGNOSIS — I10 ESSENTIAL (PRIMARY) HYPERTENSION: Primary | ICD-10-CM

## 2025-09-04 DIAGNOSIS — R50.9 FEVER AND CHILLS: ICD-10-CM

## 2025-09-04 PROCEDURE — G8427 DOCREV CUR MEDS BY ELIG CLIN: HCPCS | Performed by: FAMILY MEDICINE

## 2025-09-04 PROCEDURE — 1123F ACP DISCUSS/DSCN MKR DOCD: CPT | Performed by: FAMILY MEDICINE

## 2025-09-04 PROCEDURE — 3017F COLORECTAL CA SCREEN DOC REV: CPT | Performed by: FAMILY MEDICINE

## 2025-09-04 PROCEDURE — G8420 CALC BMI NORM PARAMETERS: HCPCS | Performed by: FAMILY MEDICINE

## 2025-09-04 PROCEDURE — 1036F TOBACCO NON-USER: CPT | Performed by: FAMILY MEDICINE

## 2025-09-04 PROCEDURE — 99213 OFFICE O/P EST LOW 20 MIN: CPT | Performed by: FAMILY MEDICINE

## 2025-09-04 RX ORDER — FERROUS SULFATE 325(65) MG
325 TABLET ORAL
Qty: 90 TABLET | Refills: 3 | Status: SHIPPED | OUTPATIENT
Start: 2025-09-04

## 2025-09-04 RX ORDER — ERGOCALCIFEROL 1.25 MG/1
50000 CAPSULE ORAL
Qty: 6 CAPSULE | Refills: 3 | Status: SHIPPED | OUTPATIENT
Start: 2025-09-04

## 2025-09-04 SDOH — ECONOMIC STABILITY: FOOD INSECURITY: WITHIN THE PAST 12 MONTHS, THE FOOD YOU BOUGHT JUST DIDN'T LAST AND YOU DIDN'T HAVE MONEY TO GET MORE.: NEVER TRUE

## 2025-09-04 SDOH — ECONOMIC STABILITY: FOOD INSECURITY: WITHIN THE PAST 12 MONTHS, YOU WORRIED THAT YOUR FOOD WOULD RUN OUT BEFORE YOU GOT MONEY TO BUY MORE.: NEVER TRUE

## (undated) DEVICE — SYR 3ML LL TIP 1/10ML GRAD --

## (undated) DEVICE — NDL PRT INJ NSAF BLNT 18GX1.5 --

## (undated) DEVICE — KENDALL RADIOLUCENT FOAM MONITORING ELECTRODE RECTANGULAR SHAPE: Brand: KENDALL

## (undated) DEVICE — SYR 5ML 1/5 GRAD LL NSAF LF --

## (undated) DEVICE — CANNULA NSL ORAL AD FOR CAPNOFLEX CO2 O2 AIRLFE

## (undated) DEVICE — CONNECTOR TBNG OD5-7MM O2 END DISP

## (undated) DEVICE — SYR 50ML SLIP TIP NSAF LF STRL --